# Patient Record
Sex: FEMALE | Race: WHITE | NOT HISPANIC OR LATINO | Employment: FULL TIME | URBAN - METROPOLITAN AREA
[De-identification: names, ages, dates, MRNs, and addresses within clinical notes are randomized per-mention and may not be internally consistent; named-entity substitution may affect disease eponyms.]

---

## 2023-05-24 ENCOUNTER — OFFICE VISIT (OUTPATIENT)
Dept: PRIMARY CARE | Facility: CLINIC | Age: 28
End: 2023-05-24
Payer: COMMERCIAL

## 2023-05-24 VITALS
WEIGHT: 154 LBS | DIASTOLIC BLOOD PRESSURE: 63 MMHG | HEIGHT: 67 IN | OXYGEN SATURATION: 95 % | RESPIRATION RATE: 17 BRPM | BODY MASS INDEX: 24.17 KG/M2 | SYSTOLIC BLOOD PRESSURE: 99 MMHG | HEART RATE: 65 BPM

## 2023-05-24 DIAGNOSIS — Z00.00 HEALTHCARE MAINTENANCE: Primary | ICD-10-CM

## 2023-05-24 DIAGNOSIS — R07.9 CHEST PAIN AT REST: ICD-10-CM

## 2023-05-24 DIAGNOSIS — Z13.6 SCREENING FOR CARDIOVASCULAR CONDITION: ICD-10-CM

## 2023-05-24 DIAGNOSIS — M79.9 DISORDER OF SOFT TISSUE OF THORACIC CAVITY: ICD-10-CM

## 2023-05-24 PROBLEM — R11.0 NAUSEA IN ADULT: Status: ACTIVE | Noted: 2023-05-24

## 2023-05-24 PROBLEM — N32.89 BLADDER SPASM: Status: ACTIVE | Noted: 2023-05-24

## 2023-05-24 PROBLEM — K92.1 HEMATOCHEZIA: Status: ACTIVE | Noted: 2023-05-24

## 2023-05-24 PROBLEM — R10.2 FEMALE PELVIC PAIN: Status: ACTIVE | Noted: 2023-05-24

## 2023-05-24 PROBLEM — B96.89 BACTERIAL VAGINOSIS: Status: ACTIVE | Noted: 2023-05-24

## 2023-05-24 PROBLEM — R39.89 BLADDER PAIN: Status: ACTIVE | Noted: 2023-05-24

## 2023-05-24 PROBLEM — R42 DIZZINESSES: Status: ACTIVE | Noted: 2023-05-24

## 2023-05-24 PROBLEM — Q65.89 HIP DYSPLASIA (HHS-HCC): Status: ACTIVE | Noted: 2023-05-24

## 2023-05-24 PROBLEM — G90.A POTS (POSTURAL ORTHOSTATIC TACHYCARDIA SYNDROME): Status: ACTIVE | Noted: 2023-05-24

## 2023-05-24 PROBLEM — R63.5 WEIGHT GAIN: Status: ACTIVE | Noted: 2023-05-24

## 2023-05-24 PROBLEM — N95.2 VAGINAL ATROPHY: Status: ACTIVE | Noted: 2023-05-24

## 2023-05-24 PROBLEM — G47.19 EXCESSIVE DAYTIME SLEEPINESS: Status: ACTIVE | Noted: 2023-05-24

## 2023-05-24 PROBLEM — R10.9 ABDOMINAL PAIN: Status: ACTIVE | Noted: 2023-05-24

## 2023-05-24 PROBLEM — R26.2 DIFFICULTY IN WALKING INVOLVING JOINT: Status: ACTIVE | Noted: 2023-05-24

## 2023-05-24 PROBLEM — R53.83 FATIGUE: Status: ACTIVE | Noted: 2023-05-24

## 2023-05-24 PROBLEM — G25.2 ACTION TREMOR: Status: ACTIVE | Noted: 2023-05-24

## 2023-05-24 PROBLEM — K60.2 ANAL FISSURE: Status: ACTIVE | Noted: 2023-05-24

## 2023-05-24 PROBLEM — J02.9 SORE THROAT: Status: ACTIVE | Noted: 2023-05-24

## 2023-05-24 PROBLEM — M25.529 ELBOW PAIN: Status: ACTIVE | Noted: 2023-05-24

## 2023-05-24 PROBLEM — D73.89 LESION OF SPLEEN: Status: ACTIVE | Noted: 2023-05-24

## 2023-05-24 PROBLEM — N32.81 OVERACTIVE BLADDER: Status: ACTIVE | Noted: 2023-05-24

## 2023-05-24 PROBLEM — N76.0 BACTERIAL VAGINOSIS: Status: ACTIVE | Noted: 2023-05-24

## 2023-05-24 PROBLEM — N93.9 ABNORMAL UTERINE BLEEDING: Status: ACTIVE | Noted: 2023-05-24

## 2023-05-24 PROBLEM — E16.2 HYPOGLYCEMIA: Status: ACTIVE | Noted: 2023-05-24

## 2023-05-24 PROBLEM — R93.89 ABNORMAL FINDING ON IMAGING: Status: ACTIVE | Noted: 2023-05-24

## 2023-05-24 PROBLEM — G47.411 CATAPLEXY (HHS-HCC): Status: ACTIVE | Noted: 2023-05-24

## 2023-05-24 PROBLEM — R06.83 SNORING: Status: ACTIVE | Noted: 2023-05-24

## 2023-05-24 PROBLEM — R30.0 DYSURIA: Status: ACTIVE | Noted: 2023-05-24

## 2023-05-24 PROBLEM — J35.1 TONSILLAR HYPERTROPHY: Status: ACTIVE | Noted: 2023-05-24

## 2023-05-24 PROBLEM — N39.0 RECURRENT URINARY TRACT INFECTION: Status: ACTIVE | Noted: 2023-05-24

## 2023-05-24 PROBLEM — L23.7 CONTACT DERMATITIS DUE TO POISON IVY: Status: ACTIVE | Noted: 2023-05-24

## 2023-05-24 PROBLEM — H52.7 REFRACTIVE ERROR: Status: ACTIVE | Noted: 2023-05-24

## 2023-05-24 PROBLEM — M25.551 RIGHT HIP PAIN: Status: ACTIVE | Noted: 2023-05-24

## 2023-05-24 PROBLEM — M54.2 NECK PAIN: Status: ACTIVE | Noted: 2023-05-24

## 2023-05-24 PROBLEM — R49.0 MUSCLE TENSION DYSPHONIA: Status: ACTIVE | Noted: 2023-05-24

## 2023-05-24 PROBLEM — M25.851 FEMOROACETABULAR IMPINGEMENT OF RIGHT HIP: Status: ACTIVE | Noted: 2023-05-24

## 2023-05-24 PROBLEM — G47.419 NARCOLEPSY (HHS-HCC): Status: ACTIVE | Noted: 2023-05-24

## 2023-05-24 PROBLEM — K64.4 ANAL SKIN TAG: Status: ACTIVE | Noted: 2023-05-24

## 2023-05-24 PROBLEM — N89.8 VAGINAL DISCHARGE: Status: ACTIVE | Noted: 2023-05-24

## 2023-05-24 PROBLEM — M25.429 ELBOW EFFUSION: Status: ACTIVE | Noted: 2023-05-24

## 2023-05-24 PROCEDURE — 1036F TOBACCO NON-USER: CPT | Performed by: INTERNAL MEDICINE

## 2023-05-24 PROCEDURE — 99385 PREV VISIT NEW AGE 18-39: CPT | Performed by: INTERNAL MEDICINE

## 2023-05-24 RX ORDER — OXYCODONE AND ACETAMINOPHEN 5; 325 MG/1; MG/1
TABLET ORAL
COMMUNITY
Start: 2023-01-31 | End: 2023-05-24 | Stop reason: ALTCHOICE

## 2023-05-24 RX ORDER — HYDROCORTISONE 25 MG/G
CREAM TOPICAL 2 TIMES DAILY
COMMUNITY
Start: 2023-02-15 | End: 2023-05-24 | Stop reason: ALTCHOICE

## 2023-05-24 RX ORDER — GABAPENTIN 600 MG/1
600 TABLET ORAL 3 TIMES DAILY
COMMUNITY
Start: 2023-04-21 | End: 2024-02-09 | Stop reason: ALTCHOICE

## 2023-05-24 RX ORDER — CELECOXIB 100 MG/1
CAPSULE ORAL
COMMUNITY
Start: 2023-01-31 | End: 2023-05-24 | Stop reason: ALTCHOICE

## 2023-05-24 RX ORDER — LOSARTAN POTASSIUM 25 MG/1
TABLET ORAL
COMMUNITY
Start: 2023-01-31 | End: 2023-05-24 | Stop reason: ALTCHOICE

## 2023-05-24 RX ORDER — ASPIRIN 325 MG
50000 TABLET, DELAYED RELEASE (ENTERIC COATED) ORAL
COMMUNITY
Start: 2023-01-31 | End: 2023-05-24 | Stop reason: ALTCHOICE

## 2023-05-24 RX ORDER — ESCITALOPRAM OXALATE 10 MG/1
10 TABLET ORAL
COMMUNITY
End: 2023-09-14 | Stop reason: SDUPTHER

## 2023-05-24 RX ORDER — CHLORHEXIDINE GLUCONATE 4 %
LIQUID (ML) TOPICAL
COMMUNITY
Start: 2023-01-22 | End: 2023-05-24 | Stop reason: ALTCHOICE

## 2023-05-24 RX ORDER — ASPIRIN 81 MG/1
TABLET ORAL
COMMUNITY
Start: 2023-01-31 | End: 2023-05-24 | Stop reason: ALTCHOICE

## 2023-05-24 RX ORDER — SULFAMETHOXAZOLE AND TRIMETHOPRIM 800; 160 MG/1; MG/1
1 TABLET ORAL 2 TIMES DAILY
COMMUNITY
End: 2024-02-09 | Stop reason: ALTCHOICE

## 2023-05-24 RX ORDER — CHLORHEXIDINE GLUCONATE ORAL RINSE 1.2 MG/ML
SOLUTION DENTAL
COMMUNITY
Start: 2023-01-22 | End: 2023-05-24 | Stop reason: ALTCHOICE

## 2023-05-24 RX ORDER — CEPHALEXIN 500 MG/1
CAPSULE ORAL
COMMUNITY
Start: 2023-03-24 | End: 2023-05-24 | Stop reason: ALTCHOICE

## 2023-05-24 RX ORDER — OMEPRAZOLE 10 MG/1
1 CAPSULE, DELAYED RELEASE ORAL DAILY
COMMUNITY
Start: 2023-01-31 | End: 2023-05-24 | Stop reason: ALTCHOICE

## 2023-05-24 RX ORDER — DOCUSATE SODIUM 100 MG/1
CAPSULE, LIQUID FILLED ORAL
COMMUNITY
Start: 2023-01-31 | End: 2023-05-24 | Stop reason: ALTCHOICE

## 2023-05-24 RX ORDER — PREDNISONE 5 MG/1
TABLET ORAL
COMMUNITY
Start: 2023-02-15 | End: 2023-05-24 | Stop reason: ALTCHOICE

## 2023-05-24 RX ORDER — TRAMADOL HYDROCHLORIDE 50 MG/1
TABLET ORAL
COMMUNITY
Start: 2023-01-31 | End: 2023-05-24 | Stop reason: ALTCHOICE

## 2023-05-24 ASSESSMENT — ENCOUNTER SYMPTOMS
CARDIOVASCULAR NEGATIVE: 1
EYES NEGATIVE: 1
ENDOCRINE NEGATIVE: 1
NEUROLOGICAL NEGATIVE: 1
PSYCHIATRIC NEGATIVE: 1
MUSCULOSKELETAL NEGATIVE: 1
CONSTITUTIONAL NEGATIVE: 1
GASTROINTESTINAL NEGATIVE: 1
RESPIRATORY NEGATIVE: 1
HEMATOLOGIC/LYMPHATIC NEGATIVE: 1

## 2023-05-24 NOTE — PROGRESS NOTES
Subjective   Patient ID: Zara Corey is a 27 y.o. female who presents for Establish Care (NPV, physical ).  HPI    Here for pe.     Co intermit pain  chest 2 months for 1 hour and  resolved . Better with deep  breaths.  Occurs  anytime .   no help with tums.   No belching.    substernal, rad to back.   No sob  or fainting.  No     Fu  anxiety     Request blood work.     On gabapentin for  nerve pan  right leg sp  periacetabular  osteotomy and hip arthroscopy.  Hip  dysplasia.  Acetabular impingement and labral tear.  Improved .  Still in pt.      Vit d   after surgery for healing.     Currently  on  bactrim for  acute uti.   Bv.      Review of Systems   Constitutional: Negative.    HENT: Negative.     Eyes: Negative.    Respiratory: Negative.     Cardiovascular: Negative.    Gastrointestinal: Negative.    Endocrine: Negative.    Musculoskeletal: Negative.    Skin: Negative.    Neurological: Negative.    Hematological: Negative.    Psychiatric/Behavioral: Negative.     All other systems reviewed and are negative.      Objective   Physical Exam  Vitals and nursing note reviewed. Exam conducted with a chaperone present.   Constitutional:       Appearance: Normal appearance.   HENT:      Head: Normocephalic and atraumatic.      Right Ear: Tympanic membrane, ear canal and external ear normal.      Left Ear: Tympanic membrane, ear canal and external ear normal.      Nose: Nose normal.      Mouth/Throat:      Mouth: Mucous membranes are moist.      Pharynx: Oropharynx is clear.   Eyes:      Extraocular Movements: Extraocular movements intact.      Conjunctiva/sclera: Conjunctivae normal.      Pupils: Pupils are equal, round, and reactive to light.   Cardiovascular:      Rate and Rhythm: Normal rate and regular rhythm.      Pulses: Normal pulses.      Heart sounds: Normal heart sounds.   Pulmonary:      Effort: Pulmonary effort is normal.      Breath sounds: Normal breath sounds.   Abdominal:      General: Abdomen is  flat. Bowel sounds are normal.      Palpations: Abdomen is soft.   Musculoskeletal:         General: Normal range of motion.      Cervical back: Neck supple.   Skin:     General: Skin is warm and dry.      Capillary Refill: Capillary refill takes 2 to 3 seconds.   Neurological:      General: No focal deficit present.      Mental Status: She is alert and oriented to person, place, and time. Mental status is at baseline.   Psychiatric:         Mood and Affect: Mood normal.         Behavior: Behavior normal.         Thought Content: Thought content normal.         Judgment: Judgment normal.         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

## 2023-05-25 ENCOUNTER — LAB (OUTPATIENT)
Dept: LAB | Facility: LAB | Age: 28
End: 2023-05-25
Payer: COMMERCIAL

## 2023-05-25 DIAGNOSIS — Z00.00 HEALTHCARE MAINTENANCE: ICD-10-CM

## 2023-05-25 DIAGNOSIS — Z13.6 SCREENING FOR CARDIOVASCULAR CONDITION: ICD-10-CM

## 2023-05-25 LAB
ALANINE AMINOTRANSFERASE (SGPT) (U/L) IN SER/PLAS: 9 U/L (ref 7–45)
ALBUMIN (G/DL) IN SER/PLAS: 4.4 G/DL (ref 3.4–5)
ALKALINE PHOSPHATASE (U/L) IN SER/PLAS: 53 U/L (ref 33–110)
ANION GAP IN SER/PLAS: 12 MMOL/L (ref 10–20)
ASPARTATE AMINOTRANSFERASE (SGOT) (U/L) IN SER/PLAS: 15 U/L (ref 9–39)
BILIRUBIN TOTAL (MG/DL) IN SER/PLAS: 0.8 MG/DL (ref 0–1.2)
CALCIUM (MG/DL) IN SER/PLAS: 9.6 MG/DL (ref 8.6–10.6)
CARBON DIOXIDE, TOTAL (MMOL/L) IN SER/PLAS: 29 MMOL/L (ref 21–32)
CHLORIDE (MMOL/L) IN SER/PLAS: 102 MMOL/L (ref 98–107)
CHOLESTEROL (MG/DL) IN SER/PLAS: 189 MG/DL (ref 0–199)
CHOLESTEROL IN HDL (MG/DL) IN SER/PLAS: 67.8 MG/DL
CHOLESTEROL/HDL RATIO: 2.8
CREATININE (MG/DL) IN SER/PLAS: 0.88 MG/DL (ref 0.5–1.05)
ERYTHROCYTE DISTRIBUTION WIDTH (RATIO) BY AUTOMATED COUNT: 12.5 % (ref 11.5–14.5)
ERYTHROCYTE MEAN CORPUSCULAR HEMOGLOBIN CONCENTRATION (G/DL) BY AUTOMATED: 32.4 G/DL (ref 32–36)
ERYTHROCYTE MEAN CORPUSCULAR VOLUME (FL) BY AUTOMATED COUNT: 91 FL (ref 80–100)
ERYTHROCYTES (10*6/UL) IN BLOOD BY AUTOMATED COUNT: 4.76 X10E12/L (ref 4–5.2)
GFR FEMALE: >90 ML/MIN/1.73M2
GLUCOSE (MG/DL) IN SER/PLAS: 82 MG/DL (ref 74–99)
HEMATOCRIT (%) IN BLOOD BY AUTOMATED COUNT: 43.5 % (ref 36–46)
HEMOGLOBIN (G/DL) IN BLOOD: 14.1 G/DL (ref 12–16)
LDL: 104 MG/DL (ref 0–99)
LEUKOCYTES (10*3/UL) IN BLOOD BY AUTOMATED COUNT: 6.4 X10E9/L (ref 4.4–11.3)
NRBC (PER 100 WBCS) BY AUTOMATED COUNT: 0 /100 WBC (ref 0–0)
PLATELETS (10*3/UL) IN BLOOD AUTOMATED COUNT: 244 X10E9/L (ref 150–450)
POTASSIUM (MMOL/L) IN SER/PLAS: 4.4 MMOL/L (ref 3.5–5.3)
PROTEIN TOTAL: 7.2 G/DL (ref 6.4–8.2)
SODIUM (MMOL/L) IN SER/PLAS: 139 MMOL/L (ref 136–145)
THYROTROPIN (MIU/L) IN SER/PLAS BY DETECTION LIMIT <= 0.05 MIU/L: 1.91 MIU/L (ref 0.44–3.98)
TRIGLYCERIDE (MG/DL) IN SER/PLAS: 88 MG/DL (ref 0–149)
UREA NITROGEN (MG/DL) IN SER/PLAS: 9 MG/DL (ref 6–23)
VLDL: 18 MG/DL (ref 0–40)

## 2023-05-25 PROCEDURE — 36415 COLL VENOUS BLD VENIPUNCTURE: CPT

## 2023-05-25 PROCEDURE — 80061 LIPID PANEL: CPT

## 2023-05-25 PROCEDURE — 84443 ASSAY THYROID STIM HORMONE: CPT

## 2023-05-25 PROCEDURE — 80053 COMPREHEN METABOLIC PANEL: CPT

## 2023-05-25 PROCEDURE — 85027 COMPLETE CBC AUTOMATED: CPT

## 2023-09-14 DIAGNOSIS — F41.9 ANXIETY: Primary | ICD-10-CM

## 2023-09-14 RX ORDER — ESCITALOPRAM OXALATE 10 MG/1
10 TABLET ORAL
Qty: 90 TABLET | Refills: 2 | Status: SHIPPED | OUTPATIENT
Start: 2023-09-14 | End: 2024-03-12 | Stop reason: SDUPTHER

## 2023-10-06 ENCOUNTER — TREATMENT (OUTPATIENT)
Dept: PHYSICAL THERAPY | Facility: HOSPITAL | Age: 28
End: 2023-10-06
Payer: COMMERCIAL

## 2023-10-06 DIAGNOSIS — M25.551 RIGHT HIP PAIN: Primary | ICD-10-CM

## 2023-10-06 PROCEDURE — 97110 THERAPEUTIC EXERCISES: CPT | Mod: GP

## 2023-10-06 ASSESSMENT — PAIN - FUNCTIONAL ASSESSMENT: PAIN_FUNCTIONAL_ASSESSMENT: 0-10

## 2023-10-06 ASSESSMENT — PAIN SCALES - GENERAL: PAINLEVEL_OUTOF10: 0 - NO PAIN

## 2023-10-06 NOTE — PROGRESS NOTES
"Physical Therapy    Physical Therapy Treatment    Patient Name: Zara Corey  MRN: [id number]  Today's Date: 10/6/2023  Time Calculation  Start Time: 0822      Assessment:  PT Assessment  PT Assessment Results: Decreased endurance, Decreased strength  Rehab Prognosis: Excellent    Plan:  OP PT Plan  Treatment/Interventions: Education/ Instruction, Electrical stimulation, Gait training, Manual therapy, Hot pack, Neuromuscular re-education, Therapeutic exercises  PT Plan: Skilled PT (plan to check in in 3-4 weeks)  PT Frequency: 1 time per week  Duration: in 3-4 weeks  Onset Date: 01/30/23  Number of Treatments Authorized: 40 (4/40 used on 10/6/23 ($40))  Rehab Potential: Good  Plan of Care Agreement: Patient    Current Problem  1. Right hip pain  Follow Up In Physical Therapy          Subjective   General  Reason for Referral: R hip pain s/p REJI and hip arthroscopy  Referred By: Mayelin  General Comment: Patient states she is still feeling really good. She is going to the gym consistently every morning before work. She went swimming and it felt great. She states she thinks she is going to get her screws out in December       Pain  Pain Assessment: 0-10  Pain Score: 0 - No pain  Pain Location: Hip  Pain Orientation: Right    Objective       Treatments:  Therapeutic Exercise  Therapeutic Exercise Performed: Yes  Therapeutic Exercise Activity 1: upright bike L4X 5min  Therapeutic Exercise Activity 2: SLS squat tap backs to 24\" block  Therapeutic Exercise Activity 3: posterior to side lunge with slider X 10 each R/L  Therapeutic Exercise Activity 4: dynamic stretching hamstring/quad stretching  Therapeutic Exercise Activity 5: - TM jogging X 2 min walking warm up X 2 intervals jogging for 1 min with 1 min walking break (walkingpace 2.7 mph, jogging pace 4.5 mph - very mild antalgic gait, decreased ROM)  Therapeutic Exercise Activity 6: seated piriformis stretch X 1 min on R side  Therapeutic Exercise Activity 7: lateral " "step down from 8\" step X 15 R./L  Therapeutic Exercise Activity 8: SL heel raise off step X 25 R/L  Therapeutic Exercise Activity 9: Standing march with blue band around feet X 20 R/L  Therapeutic Exercise Activity 10: standing hip abduction with blue band around ankles X15 R/L  Therapeutic Exercise Activity 11: pigeon pose X 30\" R/L  Therapeutic Exercise Activity 12: pigeon pose with hip flexor stretch variation X 30\" R/L  Therapeutic Exercise Activity 13: downward dog 30\"  Activity:  Endurance: Tolerates 30+ min exercise without fatigue    OP EDUCATION:  Education  Individual(s) Educated: Patient  Education Provided: Home Exercise Program, POC  Equipment: Thera-Band (blue)    Goals:     "

## 2023-10-11 ENCOUNTER — APPOINTMENT (OUTPATIENT)
Dept: UROLOGY | Facility: HOSPITAL | Age: 28
End: 2023-10-11
Payer: COMMERCIAL

## 2023-10-24 ENCOUNTER — DOCUMENTATION (OUTPATIENT)
Dept: PHYSICAL THERAPY | Facility: HOSPITAL | Age: 28
End: 2023-10-24
Payer: COMMERCIAL

## 2023-10-24 NOTE — PROGRESS NOTES
Physical Therapy                 Therapy Communication Note    Patient Name: Zara Corey  MRN: [id number]  Today's Date: 10/24/2023     Discipline: Physical Therapy    Missed Time: No Show    Comment: Patient no showed to PT follow up appt on 10/23/23 at 3:15 PM.

## 2023-10-26 ENCOUNTER — OFFICE VISIT (OUTPATIENT)
Dept: UROLOGY | Facility: CLINIC | Age: 28
End: 2023-10-26
Payer: COMMERCIAL

## 2023-10-26 DIAGNOSIS — N83.209 CYST OF OVARY, UNSPECIFIED LATERALITY: ICD-10-CM

## 2023-10-26 DIAGNOSIS — M25.551 RIGHT HIP PAIN: ICD-10-CM

## 2023-10-26 PROCEDURE — 1036F TOBACCO NON-USER: CPT | Performed by: OBSTETRICS & GYNECOLOGY

## 2023-10-26 PROCEDURE — 99213 OFFICE O/P EST LOW 20 MIN: CPT | Performed by: OBSTETRICS & GYNECOLOGY

## 2023-10-26 NOTE — PROGRESS NOTES
FOLLOW-UP VISIT     PROBLEM LIST:    1. Right hip pain  Follow Up In Physical Therapy      2. Cyst of ovary, unspecified laterality  US pelvis transvaginal           HISTORY OF PRESENT ILLNESS:   Zara Corey is a 27 y.o. female who was last seen August 2022 for pelvic pain and recurrent UTIs.    We last saw her in August 2022 for her pelvic pain and recurrent UTIs. At that time she was going to be having hip surgery. She has had this done since then and it unfortunately did not help. She explains that she has changed behaviors following surgery that have helped somewhat. She has stopped wearing thongs and less tight fitting clothing in general. She has been even more cautious of hygeine, specifically after intercourse. She immediately showers afterwards and her UTIs have lessened. She occasionally has days where she has pain with urination but this resolves on its own. She notes that she increases hydration and it helps Her  last UTI was in May 2023.     She is having pain with sex still. She explans she just started virtual PT that has been helping. They informed her that she is always tense and that could be causing her pain. She is doing this virtual PT as she has to save her in person appts for her hip    She had some concerns regarding the cysts she still has on her ovary. When she gets her period cramps take her breath away due to pain and bleeding is heavier.    PAST MEDICAL HISTORY:  Past Medical History:   Diagnosis Date    Dysphonia 01/30/2015    Hoarseness    Encounter for gynecological examination (general) (routine) without abnormal findings 06/25/2021    Well woman exam with routine gynecological exam    Encounter for gynecological examination (general) (routine) without abnormal findings 11/15/2017    Well woman exam with routine gynecological exam    Encounter for gynecological examination (general) (routine) without abnormal findings 06/01/2020    Well woman exam with routine gynecological  exam    Other specified health status     No pertinent past surgical history    Other specified health status     No pertinent past medical history    Personal history of other diseases of the respiratory system 01/23/2015    History of asthma    Personal history of other mental and behavioral disorders     History of anorexia nervosa       PAST SURGICAL HISTORY:  Past Surgical History:   Procedure Laterality Date    OTHER SURGICAL HISTORY  12/16/2021    Ovarian cystectomy    TONSILLECTOMY  11/15/2017    Tonsillectomy        ALLERGIES:   Allergies   Allergen Reactions    Reglan [Metoclopramide Hcl] Other     Pt gets really shaky          SOCIAL HISTORY:  Patient  reports that she has never smoked. She has never used smokeless tobacco. She reports current alcohol use. She reports that she does not use drugs.   Social History     Socioeconomic History    Marital status:      Spouse name: Not on file    Number of children: Not on file    Years of education: Not on file    Highest education level: Not on file   Occupational History    Not on file   Tobacco Use    Smoking status: Never    Smokeless tobacco: Never   Vaping Use    Vaping Use: Never used   Substance and Sexual Activity    Alcohol use: Yes     Comment: socially    Drug use: Never    Sexual activity: Not on file   Other Topics Concern    Not on file   Social History Narrative    Not on file     Social Determinants of Health     Financial Resource Strain: Not on file   Food Insecurity: Not on file   Transportation Needs: Not on file   Physical Activity: Not on file   Stress: Not on file   Social Connections: Not on file   Intimate Partner Violence: Not on file   Housing Stability: Not on file       FAMILY HISTORY:  Family History   Problem Relation Name Age of Onset    Diabetes Father      Hypothyroidism Sister      Cancer Other Grandmother     Diabetes Other Grandfather        REVIEW OF SYSTEMS:  Constitutional: Negative for fever and chills. Denies  anorexia, weight loss.  Eyes: Negative for visual disturbance.   Respiratory: Negative for shortness of breath.    Cardiovascular: Negative for chest pain.   Gastrointestinal: Negative for nausea and vomiting.   Genitourinary: See interval history above.  Skin: Negative for rash.   Neurological: Negative for dizziness and numbness.   Psychiatric/Behavioral: Negative for confusion and decreased concentration.     PHYSICAL EXAM:  There were no vitals taken for this visit.  Constitutional: Patient appears well-developed and well-nourished. No distress.    Head: Normocephalic and atraumatic.    Neck: Normal range of motion.    Cardiovascular: Normal rate.    Pulmonary/Chest: Effort normal. No respiratory distress.     Musculoskeletal: Normal range of motion.    Neurological: Alert and oriented to person, place, and time.  Psychiatric: Normal mood and affect. Behavior is normal. Thought content normal.        External female genitalia is normal    There are no lesions on vulva, labia major or the labia minora     The clitoral prepuce is normal     The urethra is also normal     Speculum exam done gently and cervix visualized, no friable tissue, cysts/lesions noted. IUD still in place.        Lab Results   Component Value Date    BUN 9 05/25/2023    CREATININE 0.88 05/25/2023     05/25/2023    K 4.4 05/25/2023     05/25/2023    CO2 29 05/25/2023    CALCIUM 9.6 05/25/2023      Lab Results   Component Value Date    WBC 6.4 05/25/2023    RBC 4.76 05/25/2023    HGB 14.1 05/25/2023    HCT 43.5 05/25/2023    MCV 91 05/25/2023    MCHC 32.4 05/25/2023    RDW 12.5 05/25/2023     05/25/2023         Assessment:      1. Right hip pain  Follow Up In Physical Therapy      2. Cyst of ovary, unspecified laterality  US pelvis transvaginal          Zara Tothastrid is a 27 y.o. female with pelvic pain     Plan:   As she has concerns about the ovarian cysts she has had in the past, we will order a new pelvic ultrasound to  assess this. If there are any abnormal findings on the ultrasound we will call her and let her know. Otherwise she will follow up with us next year for her annual exam at that time.     Scribe Attestation  By signing my name below, IGisela Scribe   attest that this documentation has been prepared under the direction and in the presence of Valerie Kenney MD MPH.

## 2023-11-04 ENCOUNTER — HOSPITAL ENCOUNTER (OUTPATIENT)
Dept: RADIOLOGY | Facility: HOSPITAL | Age: 28
Discharge: HOME | End: 2023-11-04
Payer: COMMERCIAL

## 2023-11-04 DIAGNOSIS — N83.209 CYST OF OVARY, UNSPECIFIED LATERALITY: ICD-10-CM

## 2023-11-04 PROCEDURE — 76830 TRANSVAGINAL US NON-OB: CPT

## 2023-11-04 PROCEDURE — 76856 US EXAM PELVIC COMPLETE: CPT | Performed by: RADIOLOGY

## 2023-11-04 PROCEDURE — 76830 TRANSVAGINAL US NON-OB: CPT | Performed by: RADIOLOGY

## 2023-11-16 ENCOUNTER — DOCUMENTATION (OUTPATIENT)
Dept: PHYSICAL THERAPY | Facility: CLINIC | Age: 28
End: 2023-11-16
Payer: COMMERCIAL

## 2023-11-16 NOTE — PROGRESS NOTES
Discharge Summary    Name: Zara Corey  MRN: [id number]  : 1995  Date: 23    Discharge Summary: PT    Discharge Information: Date of discharge 23, Date of last visit 23, Date of evaluation 23, Number of attended visits 17, Referred by Dr. Dick, and Referred for s/p R REJI    Therapy Summary: Pt presented s/p R REJI.  She had delayed wound healing and some trouble getting ROM, but overall made good improvements.  She went on hold d.t. insurance constraints and getting a new job with anticipated insurance changes.    Discharge Status: Status unknown, she went on hold and did not return.     Rehab Discharge Reason: Patient requested due to insurance constraints.

## 2023-11-27 ENCOUNTER — DOCUMENTATION (OUTPATIENT)
Dept: PHYSICAL THERAPY | Facility: HOSPITAL | Age: 28
End: 2023-11-27
Payer: COMMERCIAL

## 2023-11-27 NOTE — PROGRESS NOTES
Physical Therapy    Discharge Summary    Name: Zara Corey  MRN: [id number]  : 1995  Date: 23    Discharge Summary: PT    Discharge Information: Date of discharge 23, Date of last visit 10/6/23, Date of evaluation 23, Number of attended visits 4, Referred by Mayelin, and Referred for R hip pain s/p REJI and hip arthroscopy    Therapy Summary:  worked to improvve B LE strenth, tension/tenderness in R glute and groin area, pain levels, jogging mechancis    Discharge Status: at last appt on 10/6/23:   Patient had progressed really well, was feeling ready to be Dced. No showed to final appt though. Talked to patient on the phone who stated she returned to her normal gym routine and was still feeling okay. Kept chart open 30 days from that point.    Rehab Discharge Reason: Achieved all and/or the most significant goals(s)

## 2023-12-08 ENCOUNTER — APPOINTMENT (OUTPATIENT)
Dept: UROLOGY | Facility: CLINIC | Age: 28
End: 2023-12-08
Payer: COMMERCIAL

## 2024-01-31 ENCOUNTER — OFFICE VISIT (OUTPATIENT)
Dept: ORTHOPEDIC SURGERY | Facility: CLINIC | Age: 29
End: 2024-01-31
Payer: COMMERCIAL

## 2024-01-31 ENCOUNTER — HOSPITAL ENCOUNTER (OUTPATIENT)
Dept: RADIOLOGY | Facility: CLINIC | Age: 29
Discharge: HOME | End: 2024-01-31
Payer: COMMERCIAL

## 2024-01-31 DIAGNOSIS — Q65.89 HIP DYSPLASIA (HHS-HCC): ICD-10-CM

## 2024-01-31 DIAGNOSIS — T84.84XA PAINFUL ORTHOPAEDIC HARDWARE (CMS-HCC): ICD-10-CM

## 2024-01-31 DIAGNOSIS — Q65.89 HIP DYSPLASIA (HHS-HCC): Primary | ICD-10-CM

## 2024-01-31 PROCEDURE — 72190 X-RAY EXAM OF PELVIS: CPT

## 2024-01-31 PROCEDURE — 72190 X-RAY EXAM OF PELVIS: CPT | Performed by: RADIOLOGY

## 2024-01-31 PROCEDURE — 1036F TOBACCO NON-USER: CPT | Performed by: ORTHOPAEDIC SURGERY

## 2024-01-31 PROCEDURE — 99214 OFFICE O/P EST MOD 30 MIN: CPT | Performed by: ORTHOPAEDIC SURGERY

## 2024-01-31 RX ORDER — SODIUM CHLORIDE 9 MG/ML
100 INJECTION, SOLUTION INTRAVENOUS CONTINUOUS
Status: CANCELLED | OUTPATIENT
Start: 2024-01-31

## 2024-01-31 NOTE — PROGRESS NOTES
28 y.o. female 1 year out from a right combined REJI and hip arthroscopy.  She is having a few issues today.  First is right superior pelvis pain over her iliac crest.  This is likely related to her screws.  I do feel prominent to her.  She feels some cramping and muscle spasms in this area as well.  She also does feel posterior ischial pain near her hamstrings origin and to a lesser degree she will have some medial and anterior groin pain significant activity.  The biggest issue is her iliac crest type pain followed by her ischial tuberosity followed by anterior medial groin.  She is also having left-sided symptoms due to likely overloading compensation on her right side.  Her left side is starting to feel similar to her right did prior to surgery.  She also mentions that she has some radiating pain down the back of her leg past her knee into her lateral calf.    The patient does not endorse fevers and chills. The patient does not endorse any change in her vision or hearing. They do not endorse chest pain, shortness of breath. The patient does not endorse any abdominal discomfort. They do not endorse any skin irritation or lesions. They do not endorse any new numbness and tingling or as otherwise stated in the history of present illness.    She is in no acute distress, alert and oriented x 3.    Mood and affect are appropriate.    Respirations are unlabored.    Distal limb is pink and well perfused.    Right lower extremity evaluation demonstrates well-healed surgical incision with moderate discomfort over her screws.  Mildly positive anterior impingement test.  Negative posterior apprehension test.  Lateral cutaneous nerve sensation is partially intact.  Gait examination is nonantalgic.  Sensation is intact to light touch in the tibial, sural, saphenous, superficial peroneal, and deep peroneal nerve distributions. Foot is warm and well-perfused.    Left side demonstrates positive anterior impingement test and  positive posterior apprehension test with a positive Stinchfield maneuver.    I personally reviewed multiple views of the hip and pelvis were obtained in the office today demonstrate maintenance of reduction of her osteotomy fragment, interval healing, and a stable position of the hardware.    28 y.o. female 1 year out from a right combined REJI and hip arthroscopy doing well.  At this point I recommended hardware removal on the right due to her local irritation and pain.  Will schedule that for 2/16.  After that we will check her in the office and likely order an MR arthrogram to evaluate the internal aspect of her hip and also to look at her hamstrings origin attachment.    Natural History reviewed. All questions answered. The patient was in agreement with the plan.      **This note was created using voice recognition software and was not corrected for typographical or grammatical errors.**

## 2024-02-01 PROBLEM — T84.84XA PAINFUL ORTHOPAEDIC HARDWARE (CMS-HCC): Status: ACTIVE | Noted: 2024-01-31

## 2024-02-09 ENCOUNTER — PRE-ADMISSION TESTING (OUTPATIENT)
Dept: PREADMISSION TESTING | Facility: HOSPITAL | Age: 29
End: 2024-02-09
Payer: COMMERCIAL

## 2024-02-09 ENCOUNTER — LAB (OUTPATIENT)
Dept: LAB | Facility: LAB | Age: 29
End: 2024-02-09
Payer: COMMERCIAL

## 2024-02-09 VITALS
OXYGEN SATURATION: 100 % | WEIGHT: 168.21 LBS | HEIGHT: 67 IN | BODY MASS INDEX: 26.4 KG/M2 | SYSTOLIC BLOOD PRESSURE: 128 MMHG | TEMPERATURE: 98.6 F | HEART RATE: 67 BPM | DIASTOLIC BLOOD PRESSURE: 71 MMHG

## 2024-02-09 DIAGNOSIS — Q65.89 HIP DYSPLASIA (HHS-HCC): ICD-10-CM

## 2024-02-09 DIAGNOSIS — T84.84XA PAINFUL ORTHOPAEDIC HARDWARE (CMS-HCC): ICD-10-CM

## 2024-02-09 LAB
ANION GAP SERPL CALC-SCNC: 10 MMOL/L
APTT PPP: 28.5 SECONDS (ref 22–32.5)
BASOPHILS # BLD AUTO: 0.06 X10*3/UL (ref 0–0.1)
BASOPHILS NFR BLD AUTO: 0.6 %
BUN SERPL-MCNC: 18 MG/DL (ref 8–25)
CALCIUM SERPL-MCNC: 9.5 MG/DL (ref 8.5–10.4)
CHLORIDE SERPL-SCNC: 102 MMOL/L (ref 97–107)
CO2 SERPL-SCNC: 27 MMOL/L (ref 24–31)
CREAT SERPL-MCNC: 0.8 MG/DL (ref 0.4–1.6)
EGFRCR SERPLBLD CKD-EPI 2021: >90 ML/MIN/1.73M*2
EOSINOPHIL # BLD AUTO: 0.19 X10*3/UL (ref 0–0.7)
EOSINOPHIL NFR BLD AUTO: 1.9 %
ERYTHROCYTE [DISTWIDTH] IN BLOOD BY AUTOMATED COUNT: 12.3 % (ref 11.5–14.5)
GLUCOSE SERPL-MCNC: 67 MG/DL (ref 65–99)
HCT VFR BLD AUTO: 40.6 % (ref 36–46)
HGB BLD-MCNC: 13.8 G/DL (ref 12–16)
IMM GRANULOCYTES # BLD AUTO: 0.03 X10*3/UL (ref 0–0.7)
IMM GRANULOCYTES NFR BLD AUTO: 0.3 % (ref 0–0.9)
INR PPP: 1 (ref 0.9–1.2)
LYMPHOCYTES # BLD AUTO: 2.21 X10*3/UL (ref 1.2–4.8)
LYMPHOCYTES NFR BLD AUTO: 22.2 %
MCH RBC QN AUTO: 30.6 PG (ref 26–34)
MCHC RBC AUTO-ENTMCNC: 34 G/DL (ref 32–36)
MCV RBC AUTO: 90 FL (ref 80–100)
MONOCYTES # BLD AUTO: 0.62 X10*3/UL (ref 0.1–1)
MONOCYTES NFR BLD AUTO: 6.2 %
NEUTROPHILS # BLD AUTO: 6.83 X10*3/UL (ref 1.2–7.7)
NEUTROPHILS NFR BLD AUTO: 68.8 %
NRBC BLD-RTO: 0 /100 WBCS (ref 0–0)
PLATELET # BLD AUTO: 324 X10*3/UL (ref 150–450)
POTASSIUM SERPL-SCNC: 4.5 MMOL/L (ref 3.4–5.1)
PROTHROMBIN TIME: 10.8 SECONDS (ref 9.3–12.7)
RBC # BLD AUTO: 4.51 X10*6/UL (ref 4–5.2)
SODIUM SERPL-SCNC: 139 MMOL/L (ref 133–145)
WBC # BLD AUTO: 9.9 X10*3/UL (ref 4.4–11.3)

## 2024-02-09 PROCEDURE — 36415 COLL VENOUS BLD VENIPUNCTURE: CPT

## 2024-02-09 PROCEDURE — 80048 BASIC METABOLIC PNL TOTAL CA: CPT

## 2024-02-09 PROCEDURE — 85025 COMPLETE CBC W/AUTO DIFF WBC: CPT

## 2024-02-09 PROCEDURE — 85730 THROMBOPLASTIN TIME PARTIAL: CPT

## 2024-02-09 PROCEDURE — 99203 OFFICE O/P NEW LOW 30 MIN: CPT | Performed by: PHYSICIAN ASSISTANT

## 2024-02-09 PROCEDURE — 85610 PROTHROMBIN TIME: CPT

## 2024-02-09 RX ORDER — CALCIUM CARBONATE 300MG(750)
400 TABLET,CHEWABLE ORAL DAILY
COMMUNITY

## 2024-02-09 RX ORDER — MULTIVITAMIN/IRON/FOLIC ACID 18MG-0.4MG
1 TABLET ORAL DAILY
COMMUNITY

## 2024-02-09 RX ORDER — GLUCOSAM/CHONDRO/HERB 149/HYAL 750-100 MG
2 TABLET ORAL DAILY
COMMUNITY

## 2024-02-09 RX ORDER — ACETAMINOPHEN 500 MG
2000 TABLET ORAL DAILY
COMMUNITY

## 2024-02-09 ASSESSMENT — CHADS2 SCORE
DIABETES: NO
HYPERTENSION: NO
AGE GREATER THAN OR EQUAL TO 75: NO
CHADS2 SCORE: 0
CHF: NO
PRIOR STROKE OR TIA OR THROMBOEMBOLISM: NO

## 2024-02-09 ASSESSMENT — DUKE ACTIVITY SCORE INDEX (DASI)
CAN YOU WALK A BLOCK OR TWO ON LEVEL GROUND: YES
TOTAL_SCORE: 58.2
CAN YOU TAKE CARE OF YOURSELF (EAT, DRESS, BATHE, OR USE TOILET): YES
CAN YOU PARTICIPATE IN MODERATE RECREATIONAL ACTIVITIES LIKE GOLF, BOWLING, DANCING, DOUBLES TENNIS OR THROWING A BASEBALL OR FOOTBALL: YES
CAN YOU HAVE SEXUAL RELATIONS: YES
CAN YOU WALK INDOORS, SUCH AS AROUND YOUR HOUSE: YES
CAN YOU DO LIGHT WORK AROUND THE HOUSE LIKE DUSTING OR WASHING DISHES: YES
CAN YOU DO HEAVY WORK AROUND THE HOUSE LIKE SCRUBBING FLOORS OR LIFTING AND MOVING HEAVY FURNITURE: YES
CAN YOU DO YARD WORK LIKE RAKING LEAVES, WEEDING OR PUSHING A MOWER: YES
CAN YOU CLIMB A FLIGHT OF STAIRS OR WALK UP A HILL: YES
CAN YOU PARTICIPATE IN STRENOUS SPORTS LIKE SWIMMING, SINGLES TENNIS, FOOTBALL, BASKETBALL, OR SKIING: YES
DASI METS SCORE: 9.9
CAN YOU DO MODERATE WORK AROUND THE HOUSE LIKE VACUUMING, SWEEPING FLOORS OR CARRYING GROCERIES: YES
CAN YOU RUN A SHORT DISTANCE: YES

## 2024-02-09 ASSESSMENT — PAIN SCALES - GENERAL: PAINLEVEL_OUTOF10: 3

## 2024-02-09 ASSESSMENT — LIFESTYLE VARIABLES: SMOKING_STATUS: NONSMOKER

## 2024-02-09 ASSESSMENT — PAIN - FUNCTIONAL ASSESSMENT: PAIN_FUNCTIONAL_ASSESSMENT: 0-10

## 2024-02-09 ASSESSMENT — PAIN DESCRIPTION - DESCRIPTORS: DESCRIPTORS: SHARP

## 2024-02-09 NOTE — PREPROCEDURE INSTRUCTIONS
Medication List            Accurate as of February 9, 2024  7:28 AM. Always use your most recent med list.                b complex 0.4 mg tablet  Medication Adjustments for Surgery: Stop 7 days before surgery     escitalopram 10 mg tablet  Commonly known as: Lexapro  Take 1 tablet (10 mg) by mouth once daily.  Medication Adjustments for Surgery: Continue until night before surgery     Fish OiL 1,000 mg (120 mg-180 mg) capsule  Generic drug: omega 3-dha-epa-fish oil  Medication Adjustments for Surgery: Stop 7 days before surgery     levonorgestrel 21 mcg/24 hours (8 yrs) 52 mg IUD  Commonly known as: Mirena     magnesium oxide 400 mg tablet  Commonly known as: Mag-Ox  Medication Adjustments for Surgery: Stop 7 days before surgery     Vitamin D3 50 mcg (2,000 unit) capsule  Generic drug: cholecalciferol  Medication Adjustments for Surgery: Stop 7 days before surgery                              NPO Instructions:    Do not eat any food after midnight the night before your surgery/procedure.    Additional Instructions:     Day of Surgery:  Review your medication instructions, take indicated medications  Wear  comfortable loose fitting clothing  Do not use moisturizers, creams, lotions or perfume  All jewelry and valuables should be left at home  PAT DISCHARGE INSTRUCTIONS    Please call the Same Day Surgery (SDS) Department of the hospital where your procedure will be performed after 2:00 PM the day before your surgery. If you are scheduled on a Monday, or a Tuesday following a Monday holiday, you will need to call on the last business day prior to your surgery.    [facility]  [address] [place] [address]  [phone]    Please let your surgeon know if:      You develop any open sores, shingles, burning or painful urination as these may increase your risk of an infection.   You no longer wish to have the surgery.   Any other personal circumstances change that may lead  to the need to cancel or defer this surgery-such as being sick or getting admitted to any hospital within one week of your planned procedure.    Your contact details change, such as a change of address or phone number.    Starting now:     Please DO NOT drink alcohol or smoke for 24 hours before surgery. It is well known that quitting smoking can make a huge difference to your health and recovery from surgery. The longer you abstain from smoking, the better your chances of a healthy recovery. If you need help with quitting, call 1-800-QUIT-NOW to be connected to a trained counselor who will discuss the best methods to help you quit.     Before your surgery:    Please stop all supplements 7 days prior to surgery. Or as directed by your surgeon.   Please stop taking NSAID pain medicine such as Advil and Motrin 7 days before surgery.    If you develop any fever, cough, cold, rashes, cuts, scratches, scrapes, urinary symptoms or infection anywhere on your body (including teeth and gums) prior to surgery, please call your surgeon’s office as soon as possible. This may require treatment to reduce the chance of cancellation on the day of surgery.    The day before your surgery:   DIET- Do not eat or drink anything after midnight the night before surgery, including mints, candy and gum.   Get a good night’s rest.  Use the special soap for bathing if you have been instructed to use one.    Scheduled surgery times may change and you will be notified if this occurs - please check your personal voicemail for any updates.     On the morning of surgery:   Wear comfortable, loose fitting clothes which open in the front. Please do not wear moisturizers, creams, lotions, makeup or perfume.    Please bring with you to surgery:   Photo ID and insurance card   Current list of medicines and allergies   Pacemaker/ Defibrillator/Heart stent cards   CPAP machine and mask    Slings/ splints/ crutches   A copy of your complete advanced  directive/DHPOA.    Please do NOT bring with you to surgery:   All jewelry and valuables should be left at home.   Prosthetic devices such as contact lenses, hearing aids, dentures, eyelash extensions, hairpins and body piercings must be removed prior to going in to the surgical suite.    After outpatient surgery:   A responsible adult MUST accompany you at the time of discharge and stay with you for 24 hours after your surgery. You may NOT drive yourself home after surgery.    Do not drive, operate machinery, make critical decisions or do activities that require co-ordination or balance until after a night’s sleep.   Do not drink alcoholic beverages for 24 hours.   Instructions for resuming your medications will be provided by your surgeon.    CALL YOUR DOCTOR AFTER SURGERY IF YOU HAVE:     Chills and/or a fever of 101° F or higher.    Redness, swelling, pus or drainage from your surgical wound or a bad smell from the wound.    Lightheadedness, fainting or confusion.    Persistent vomiting (throwing up) and are not able to eat or drink for 12 hours.    Three or more loose, watery bowel movements in 24 hours (diarrhea).   Difficulty or pain while urinating( after non-urological surgery)    Pain and swelling in your legs, especially if it is only on one side.    Difficulty breathing or are breathing faster than normal.    Any new concerning symptoms.

## 2024-02-09 NOTE — CPM/PAT H&P
CPM/PAT Evaluation       [facility] ([facility]  /Age: 1995/ y.o.     In-Person       Chief Complaint: Painful hardware    HPI 28-year-old female with history of congenital hip dysplasia.  Patient underwent hip arthroscopy with labral repair 2023.  Patient complains of painful hardware.  She denies fevers or chills.  Patient has history of anxiety, depression and anorexia nervosa.  Patient is scheduled for removal of painful hardware right pelvis 2024    Past Medical History:   Diagnosis Date    Anorexia nervosa     Anxiety and depression     Dysphonia 2015    Hoarseness    Encounter for gynecological examination (general) (routine) without abnormal findings 2021    Well woman exam with routine gynecological exam    Encounter for gynecological examination (general) (routine) without abnormal findings 11/15/2017    Well woman exam with routine gynecological exam    Encounter for gynecological examination (general) (routine) without abnormal findings 2020    Well woman exam with routine gynecological exam    Personal history of other diseases of the respiratory system 2015    History of asthma    PONV (postoperative nausea and vomiting)        Past Surgical History:   Procedure Laterality Date    HIP ARTHROSCOPY W/ LABRAL REPAIR Right     OVARIAN CYST REMOVAL      TONSILLECTOMY  11/15/2017    Tonsillectomy       Patient  has no history on file for sexual activity.    Family History   Problem Relation Name Age of Onset    Diabetes Father      Hypothyroidism Sister      Cancer Other Grandmother     Diabetes Other Grandfather        Allergies   Allergen Reactions    Reglan [Metoclopramide Hcl] Other     Pt gets really shaky     Adhesive Tape-Silicones Rash       Current Outpatient Medications   Medication Instructions    b complex 0.4 mg tablet 1 tablet, oral, Daily    cholecalciferol (VITAMIN D3) 2,000 Units, oral, Daily    escitalopram (LEXAPRO) 10 mg,  "oral, Daily RT    levonorgestrel (Mirena) 21 mcg/24 hours (8 yrs) 52 mg IUD 1 each, intrauterine, Once    magnesium oxide (MAG-OX) 400 mg, oral, Daily    omega 3-dha-epa-fish oil (Fish OiL) 1,000 mg (120 mg-180 mg) capsule 2 capsules, oral, Daily     Review of Systems   Eyes:         Patient wears glasses   All other systems reviewed and are negative.       Physical Exam  Vitals reviewed. Physical exam within normal limits.   Constitutional:       Appearance: Normal appearance.   HENT:      Head: Normocephalic and atraumatic.      Mouth/Throat:      Mouth: Mucous membranes are moist.   Eyes:      Extraocular Movements: Extraocular movements intact.      Pupils: Pupils are equal, round, and reactive to light.   Cardiovascular:      Rate and Rhythm: Normal rate and regular rhythm.      Pulses: Normal pulses.      Heart sounds: Normal heart sounds.   Pulmonary:      Effort: Pulmonary effort is normal.      Breath sounds: Normal breath sounds.   Abdominal:      General: Bowel sounds are normal.      Palpations: Abdomen is soft.   Musculoskeletal:         General: Normal range of motion.      Cervical back: Normal range of motion.   Lymphadenopathy:      Cervical: No cervical adenopathy.   Skin:     General: Skin is warm and dry.   Neurological:      General: No focal deficit present.      Mental Status: She is alert and oriented to person, place, and time.   Psychiatric:         Mood and Affect: Mood normal.         Behavior: Behavior normal.         Thought Content: Thought content normal.         Judgment: Judgment normal.          PAT AIRWAY:   Airway:     Mallampati::  I    Neck ROM::  Full      Vitals  Heart Rate:  [67]   Temp:  [37 °C (98.6 °F)]   BP: (128)/(71)   Height:  [170.2 cm (5' 7\")]   Weight:  [76.3 kg (168 lb 3.4 oz)]   SpO2:  [100 %]        DASI Risk Score      Flowsheet Row Most Recent Value   DASI SCORE 58.2   METS Score (Will be calculated only when all the questions are answered) 9.9      "     Caprini DVT Assessment      Flowsheet Row Most Recent Value   DVT Score 2   History Prior major surgery   Age Less than 40 years   BMI 30 or less          Modified Frailty Index    No data to display       CHADS2 Stroke Risk  Current as of 9 minutes ago        N/A 3 - 100%: High Risk   2 - 3%: Medium Risk   0 - 2%: Low Risk     Last Change: N/A          This score determines the patient's risk of having a stroke if the patient has atrial fibrillation.        This score is not applicable to this patient. Components are not calculated.          Revised Cardiac Risk Index      Flowsheet Row Most Recent Value   Revised Cardiac Risk Calculator 0          Apfel Simplified Score      Flowsheet Row Most Recent Value   Apfel Simplified Score Calculator 3          Risk Analysis Index Results This Encounter    No data found in the last 1 encounters.       Stop Bang Score      Flowsheet Row Most Recent Value   Do you snore loudly? 0   Do you often feel tired or fatigued after your sleep? 0   Has anyone ever observed you stop breathing in your sleep? 0   Do you have or are you being treated for high blood pressure? 0   Recent BMI (Calculated) 26.3   Is BMI greater than 35 kg/m2? 0=No   Age older than 50 years old? 0=No   Is your neck circumference greater than 17 inches (Male) or 16 inches (Female)? 0   Gender - Male 0=No   STOP-BANG Total Score 0            Assessment and Plan:   1.  Painful orthopedic hardware   Plan: Removal of painful hardware right pelvis 02/16/2024  2.  Anxiety and depression-controlled with medication  3.  ASA 2       Caprini DVT score 2       Stop bang score 0       CHADS2 score 0       DASI score 58.2       METS score 9.9       RCRI score 0       Apfel score 3       ARISCAT score 17 low risk=1.6% risk of postop pulmonary complication    4.  Labs ordered by Dr. Dick CBC, BMP and coag screen

## 2024-02-09 NOTE — H&P (VIEW-ONLY)
CPM/PAT Evaluation       [facility]  /Age: 1995/ y.o.     In-Person       Chief Complaint: Painful hardware    HPI 28-year-old female with history of congenital hip dysplasia.  Patient underwent hip arthroscopy with labral repair 2023.  Patient complains of painful hardware.  She denies fevers or chills.  Patient has history of anxiety, depression and anorexia nervosa.  Patient is scheduled for removal of painful hardware right pelvis 2024    Past Medical History:   Diagnosis Date    Anorexia nervosa     Anxiety and depression     Dysphonia 2015    Hoarseness    Encounter for gynecological examination (general) (routine) without abnormal findings 2021    Well woman exam with routine gynecological exam    Encounter for gynecological examination (general) (routine) without abnormal findings 11/15/2017    Well woman exam with routine gynecological exam    Encounter for gynecological examination (general) (routine) without abnormal findings 2020    Well woman exam with routine gynecological exam    Personal history of other diseases of the respiratory system 2015    History of asthma    PONV (postoperative nausea and vomiting)        Past Surgical History:   Procedure Laterality Date    HIP ARTHROSCOPY W/ LABRAL REPAIR Right     OVARIAN CYST REMOVAL      TONSILLECTOMY  11/15/2017    Tonsillectomy       Patient  has no history on file for sexual activity.    Family History   Problem Relation Name Age of Onset    Diabetes Father      Hypothyroidism Sister      Cancer Other Grandmother     Diabetes Other Grandfather        Allergies   Allergen Reactions    Reglan [Metoclopramide Hcl] Other     Pt gets really shaky     Adhesive Tape-Silicones Rash       Current Outpatient Medications   Medication Instructions    b complex 0.4 mg tablet 1 tablet, oral, Daily    cholecalciferol (VITAMIN D3) 2,000 Units, oral, Daily    escitalopram (LEXAPRO) 10 mg,  "oral, Daily RT    levonorgestrel (Mirena) 21 mcg/24 hours (8 yrs) 52 mg IUD 1 each, intrauterine, Once    magnesium oxide (MAG-OX) 400 mg, oral, Daily    omega 3-dha-epa-fish oil (Fish OiL) 1,000 mg (120 mg-180 mg) capsule 2 capsules, oral, Daily     Review of Systems   Eyes:         Patient wears glasses   All other systems reviewed and are negative.       Physical Exam  Vitals reviewed. Physical exam within normal limits.   Constitutional:       Appearance: Normal appearance.   HENT:      Head: Normocephalic and atraumatic.      Mouth/Throat:      Mouth: Mucous membranes are moist.   Eyes:      Extraocular Movements: Extraocular movements intact.      Pupils: Pupils are equal, round, and reactive to light.   Cardiovascular:      Rate and Rhythm: Normal rate and regular rhythm.      Pulses: Normal pulses.      Heart sounds: Normal heart sounds.   Pulmonary:      Effort: Pulmonary effort is normal.      Breath sounds: Normal breath sounds.   Abdominal:      General: Bowel sounds are normal.      Palpations: Abdomen is soft.   Musculoskeletal:         General: Normal range of motion.      Cervical back: Normal range of motion.   Lymphadenopathy:      Cervical: No cervical adenopathy.   Skin:     General: Skin is warm and dry.   Neurological:      General: No focal deficit present.      Mental Status: She is alert and oriented to person, place, and time.   Psychiatric:         Mood and Affect: Mood normal.         Behavior: Behavior normal.         Thought Content: Thought content normal.         Judgment: Judgment normal.          PAT AIRWAY:   Airway:     Mallampati::  I    Neck ROM::  Full      Vitals  Heart Rate:  [67]   Temp:  [37 °C (98.6 °F)]   BP: (128)/(71)   Height:  [170.2 cm (5' 7\")]   Weight:  [76.3 kg (168 lb 3.4 oz)]   SpO2:  [100 %]        DASI Risk Score      Flowsheet Row Most Recent Value   DASI SCORE 58.2   METS Score (Will be calculated only when all the questions are answered) 9.9      "     Caprini DVT Assessment      Flowsheet Row Most Recent Value   DVT Score 2   History Prior major surgery   Age Less than 40 years   BMI 30 or less          Modified Frailty Index    No data to display       CHADS2 Stroke Risk  Current as of 9 minutes ago        N/A 3 - 100%: High Risk   2 - 3%: Medium Risk   0 - 2%: Low Risk     Last Change: N/A          This score determines the patient's risk of having a stroke if the patient has atrial fibrillation.        This score is not applicable to this patient. Components are not calculated.          Revised Cardiac Risk Index      Flowsheet Row Most Recent Value   Revised Cardiac Risk Calculator 0          Apfel Simplified Score      Flowsheet Row Most Recent Value   Apfel Simplified Score Calculator 3          Risk Analysis Index Results This Encounter    No data found in the last 1 encounters.       Stop Bang Score      Flowsheet Row Most Recent Value   Do you snore loudly? 0   Do you often feel tired or fatigued after your sleep? 0   Has anyone ever observed you stop breathing in your sleep? 0   Do you have or are you being treated for high blood pressure? 0   Recent BMI (Calculated) 26.3   Is BMI greater than 35 kg/m2? 0=No   Age older than 50 years old? 0=No   Is your neck circumference greater than 17 inches (Male) or 16 inches (Female)? 0   Gender - Male 0=No   STOP-BANG Total Score 0            Assessment and Plan:   1.  Painful orthopedic hardware   Plan: Removal of painful hardware right pelvis 02/16/2024  2.  Anxiety and depression-controlled with medication  3.  ASA 2       Caprini DVT score 2       Stop bang score 0       CHADS2 score 0       DASI score 58.2       METS score 9.9       RCRI score 0       Apfel score 3       ARISCAT score 17 low risk=1.6% risk of postop pulmonary complication    4.  Labs ordered by Dr. Dick CBC, BMP and coag screen

## 2024-02-14 ENCOUNTER — ANESTHESIA EVENT (OUTPATIENT)
Dept: OPERATING ROOM | Facility: HOSPITAL | Age: 29
End: 2024-02-14
Payer: COMMERCIAL

## 2024-02-16 ENCOUNTER — HOSPITAL ENCOUNTER (OUTPATIENT)
Facility: HOSPITAL | Age: 29
Setting detail: OUTPATIENT SURGERY
Discharge: HOME | End: 2024-02-16
Attending: ORTHOPAEDIC SURGERY | Admitting: ORTHOPAEDIC SURGERY
Payer: COMMERCIAL

## 2024-02-16 ENCOUNTER — APPOINTMENT (OUTPATIENT)
Dept: RADIOLOGY | Facility: HOSPITAL | Age: 29
End: 2024-02-16
Payer: COMMERCIAL

## 2024-02-16 ENCOUNTER — ANESTHESIA (OUTPATIENT)
Dept: OPERATING ROOM | Facility: HOSPITAL | Age: 29
End: 2024-02-16
Payer: COMMERCIAL

## 2024-02-16 VITALS
HEART RATE: 70 BPM | SYSTOLIC BLOOD PRESSURE: 117 MMHG | RESPIRATION RATE: 16 BRPM | DIASTOLIC BLOOD PRESSURE: 72 MMHG | HEIGHT: 67 IN | BODY MASS INDEX: 26.4 KG/M2 | OXYGEN SATURATION: 100 % | TEMPERATURE: 98.2 F | WEIGHT: 168.21 LBS

## 2024-02-16 DIAGNOSIS — F41.9 ANXIETY: ICD-10-CM

## 2024-02-16 DIAGNOSIS — T84.84XA PAINFUL ORTHOPAEDIC HARDWARE (CMS-HCC): ICD-10-CM

## 2024-02-16 DIAGNOSIS — Q65.89 HIP DYSPLASIA (HHS-HCC): Primary | ICD-10-CM

## 2024-02-16 LAB — HCG UR QL IA.RAPID: NEGATIVE

## 2024-02-16 PROCEDURE — A20680 PR REMOVAL DEEP IMPLANT: Performed by: ANESTHESIOLOGY

## 2024-02-16 PROCEDURE — A20680 PR REMOVAL DEEP IMPLANT: Performed by: ANESTHESIOLOGIST ASSISTANT

## 2024-02-16 PROCEDURE — 7100000002 HC RECOVERY ROOM TIME - EACH INCREMENTAL 1 MINUTE: Performed by: ORTHOPAEDIC SURGERY

## 2024-02-16 PROCEDURE — 3600000002 HC OR TIME - INITIAL BASE CHARGE - PROCEDURE LEVEL TWO: Performed by: ORTHOPAEDIC SURGERY

## 2024-02-16 PROCEDURE — 7100000009 HC PHASE TWO TIME - INITIAL BASE CHARGE: Performed by: ORTHOPAEDIC SURGERY

## 2024-02-16 PROCEDURE — 7100000001 HC RECOVERY ROOM TIME - INITIAL BASE CHARGE: Performed by: ORTHOPAEDIC SURGERY

## 2024-02-16 PROCEDURE — 7100000010 HC PHASE TWO TIME - EACH INCREMENTAL 1 MINUTE: Performed by: ORTHOPAEDIC SURGERY

## 2024-02-16 PROCEDURE — 2720000007 HC OR 272 NO HCPCS: Performed by: ORTHOPAEDIC SURGERY

## 2024-02-16 PROCEDURE — 3700000002 HC GENERAL ANESTHESIA TIME - EACH INCREMENTAL 1 MINUTE: Performed by: ORTHOPAEDIC SURGERY

## 2024-02-16 PROCEDURE — 2500000005 HC RX 250 GENERAL PHARMACY W/O HCPCS: Performed by: ORTHOPAEDIC SURGERY

## 2024-02-16 PROCEDURE — 2500000005 HC RX 250 GENERAL PHARMACY W/O HCPCS: Performed by: ANESTHESIOLOGIST ASSISTANT

## 2024-02-16 PROCEDURE — 2500000004 HC RX 250 GENERAL PHARMACY W/ HCPCS (ALT 636 FOR OP/ED): Performed by: ANESTHESIOLOGIST ASSISTANT

## 2024-02-16 PROCEDURE — 2500000004 HC RX 250 GENERAL PHARMACY W/ HCPCS (ALT 636 FOR OP/ED): Performed by: ORTHOPAEDIC SURGERY

## 2024-02-16 PROCEDURE — 20680 REMOVAL OF IMPLANT DEEP: CPT | Performed by: PHYSICIAN ASSISTANT

## 2024-02-16 PROCEDURE — 2500000004 HC RX 250 GENERAL PHARMACY W/ HCPCS (ALT 636 FOR OP/ED): Performed by: ANESTHESIOLOGY

## 2024-02-16 PROCEDURE — 2500000004 HC RX 250 GENERAL PHARMACY W/ HCPCS (ALT 636 FOR OP/ED): Performed by: PHYSICIAN ASSISTANT

## 2024-02-16 PROCEDURE — 3600000007 HC OR TIME - EACH INCREMENTAL 1 MINUTE - PROCEDURE LEVEL TWO: Performed by: ORTHOPAEDIC SURGERY

## 2024-02-16 PROCEDURE — 20680 REMOVAL OF IMPLANT DEEP: CPT | Performed by: ORTHOPAEDIC SURGERY

## 2024-02-16 PROCEDURE — 81025 URINE PREGNANCY TEST: CPT | Performed by: PHYSICIAN ASSISTANT

## 2024-02-16 PROCEDURE — 3700000001 HC GENERAL ANESTHESIA TIME - INITIAL BASE CHARGE: Performed by: ORTHOPAEDIC SURGERY

## 2024-02-16 RX ORDER — SODIUM CHLORIDE 9 MG/ML
100 INJECTION, SOLUTION INTRAVENOUS CONTINUOUS
Status: DISCONTINUED | OUTPATIENT
Start: 2024-02-16 | End: 2024-02-16 | Stop reason: HOSPADM

## 2024-02-16 RX ORDER — OXYCODONE HYDROCHLORIDE 5 MG/1
5 TABLET ORAL EVERY 6 HOURS PRN
Qty: 12 TABLET | Refills: 0 | Status: SHIPPED | OUTPATIENT
Start: 2024-02-16 | End: 2024-02-20

## 2024-02-16 RX ORDER — FENTANYL CITRATE 50 UG/ML
INJECTION, SOLUTION INTRAMUSCULAR; INTRAVENOUS AS NEEDED
Status: DISCONTINUED | OUTPATIENT
Start: 2024-02-16 | End: 2024-02-16

## 2024-02-16 RX ORDER — TRANEXAMIC ACID 100 MG/ML
INJECTION, SOLUTION INTRAVENOUS AS NEEDED
Status: DISCONTINUED | OUTPATIENT
Start: 2024-02-16 | End: 2024-02-16

## 2024-02-16 RX ORDER — CEPHALEXIN 500 MG/1
500 CAPSULE ORAL 3 TIMES DAILY
Qty: 3 CAPSULE | Refills: 0 | Status: SHIPPED | OUTPATIENT
Start: 2024-02-16 | End: 2024-02-17

## 2024-02-16 RX ORDER — PROPOFOL 10 MG/ML
INJECTION, EMULSION INTRAVENOUS AS NEEDED
Status: DISCONTINUED | OUTPATIENT
Start: 2024-02-16 | End: 2024-02-16

## 2024-02-16 RX ORDER — BUPIVACAINE HYDROCHLORIDE 5 MG/ML
INJECTION, SOLUTION PERINEURAL AS NEEDED
Status: DISCONTINUED | OUTPATIENT
Start: 2024-02-16 | End: 2024-02-16 | Stop reason: HOSPADM

## 2024-02-16 RX ORDER — HYDRALAZINE HYDROCHLORIDE 20 MG/ML
5 INJECTION INTRAMUSCULAR; INTRAVENOUS EVERY 30 MIN PRN
Status: DISCONTINUED | OUTPATIENT
Start: 2024-02-16 | End: 2024-02-16 | Stop reason: HOSPADM

## 2024-02-16 RX ORDER — MIDAZOLAM HYDROCHLORIDE 1 MG/ML
INJECTION, SOLUTION INTRAMUSCULAR; INTRAVENOUS AS NEEDED
Status: DISCONTINUED | OUTPATIENT
Start: 2024-02-16 | End: 2024-02-16

## 2024-02-16 RX ORDER — SCOLOPAMINE TRANSDERMAL SYSTEM 1 MG/1
1 PATCH, EXTENDED RELEASE TRANSDERMAL ONCE
Status: DISCONTINUED | OUTPATIENT
Start: 2024-02-16 | End: 2024-02-16 | Stop reason: HOSPADM

## 2024-02-16 RX ORDER — CEFAZOLIN SODIUM 2 G/100ML
2 INJECTION, SOLUTION INTRAVENOUS ONCE
Status: DISCONTINUED | OUTPATIENT
Start: 2024-02-16 | End: 2024-02-16 | Stop reason: SDUPTHER

## 2024-02-16 RX ORDER — ASPIRIN 81 MG
100 TABLET, DELAYED RELEASE (ENTERIC COATED) ORAL 2 TIMES DAILY
Qty: 10 TABLET | Refills: 0 | Status: SHIPPED | OUTPATIENT
Start: 2024-02-16 | End: 2024-02-21

## 2024-02-16 RX ORDER — VANCOMYCIN HYDROCHLORIDE 1 G/20ML
INJECTION, POWDER, LYOPHILIZED, FOR SOLUTION INTRAVENOUS AS NEEDED
Status: DISCONTINUED | OUTPATIENT
Start: 2024-02-16 | End: 2024-02-16 | Stop reason: HOSPADM

## 2024-02-16 RX ORDER — CEFAZOLIN SODIUM 2 G/100ML
2 INJECTION, SOLUTION INTRAVENOUS ONCE
Status: COMPLETED | OUTPATIENT
Start: 2024-02-16 | End: 2024-02-16

## 2024-02-16 RX ORDER — ASPIRIN 81 MG/1
81 TABLET ORAL 2 TIMES DAILY
Qty: 28 TABLET | Refills: 0 | Status: SHIPPED | OUTPATIENT
Start: 2024-02-16 | End: 2024-03-01

## 2024-02-16 RX ORDER — SODIUM CHLORIDE, SODIUM LACTATE, POTASSIUM CHLORIDE, CALCIUM CHLORIDE 600; 310; 30; 20 MG/100ML; MG/100ML; MG/100ML; MG/100ML
100 INJECTION, SOLUTION INTRAVENOUS CONTINUOUS
Status: DISCONTINUED | OUTPATIENT
Start: 2024-02-16 | End: 2024-02-16 | Stop reason: HOSPADM

## 2024-02-16 RX ORDER — ALBUTEROL SULFATE 0.83 MG/ML
2.5 SOLUTION RESPIRATORY (INHALATION) ONCE AS NEEDED
Status: DISCONTINUED | OUTPATIENT
Start: 2024-02-16 | End: 2024-02-16 | Stop reason: HOSPADM

## 2024-02-16 RX ORDER — ONDANSETRON HYDROCHLORIDE 2 MG/ML
INJECTION, SOLUTION INTRAVENOUS AS NEEDED
Status: DISCONTINUED | OUTPATIENT
Start: 2024-02-16 | End: 2024-02-16

## 2024-02-16 RX ORDER — FENTANYL CITRATE 50 UG/ML
50 INJECTION, SOLUTION INTRAMUSCULAR; INTRAVENOUS EVERY 5 MIN PRN
Status: DISCONTINUED | OUTPATIENT
Start: 2024-02-16 | End: 2024-02-16 | Stop reason: HOSPADM

## 2024-02-16 RX ORDER — ONDANSETRON HYDROCHLORIDE 2 MG/ML
4 INJECTION, SOLUTION INTRAVENOUS ONCE AS NEEDED
Status: DISCONTINUED | OUTPATIENT
Start: 2024-02-16 | End: 2024-02-16 | Stop reason: HOSPADM

## 2024-02-16 RX ORDER — DEXAMETHASONE SODIUM PHOSPHATE 4 MG/ML
INJECTION, SOLUTION INTRA-ARTICULAR; INTRALESIONAL; INTRAMUSCULAR; INTRAVENOUS; SOFT TISSUE AS NEEDED
Status: DISCONTINUED | OUTPATIENT
Start: 2024-02-16 | End: 2024-02-16

## 2024-02-16 RX ORDER — LABETALOL HYDROCHLORIDE 5 MG/ML
5 INJECTION, SOLUTION INTRAVENOUS ONCE AS NEEDED
Status: DISCONTINUED | OUTPATIENT
Start: 2024-02-16 | End: 2024-02-16 | Stop reason: HOSPADM

## 2024-02-16 RX ADMIN — CEFAZOLIN SODIUM 2 G: 2 INJECTION, SOLUTION INTRAVENOUS at 09:47

## 2024-02-16 RX ADMIN — SCOPALAMINE 1 PATCH: 1 PATCH, EXTENDED RELEASE TRANSDERMAL at 08:36

## 2024-02-16 RX ADMIN — SODIUM CHLORIDE 100 ML/HR: 900 INJECTION, SOLUTION INTRAVENOUS at 08:35

## 2024-02-16 RX ADMIN — FENTANYL CITRATE 50 MCG: 50 INJECTION INTRAMUSCULAR; INTRAVENOUS at 09:49

## 2024-02-16 RX ADMIN — FENTANYL CITRATE 50 MCG: 50 INJECTION INTRAMUSCULAR; INTRAVENOUS at 09:42

## 2024-02-16 RX ADMIN — ONDANSETRON 4 MG: 2 INJECTION INTRAMUSCULAR; INTRAVENOUS at 09:42

## 2024-02-16 RX ADMIN — MIDAZOLAM 2 MG: 1 INJECTION INTRAMUSCULAR; INTRAVENOUS at 09:46

## 2024-02-16 RX ADMIN — DEXAMETHASONE SODIUM PHOSPHATE 4 MG: 4 INJECTION, SOLUTION INTRAMUSCULAR; INTRAVENOUS at 09:49

## 2024-02-16 RX ADMIN — PROPOFOL 200 MG: 10 INJECTION, EMULSION INTRAVENOUS at 09:42

## 2024-02-16 RX ADMIN — TRANEXAMIC ACID 1000 MG: 1 INJECTION, SOLUTION INTRAVENOUS at 09:42

## 2024-02-16 SDOH — HEALTH STABILITY: MENTAL HEALTH: CURRENT SMOKER: 1

## 2024-02-16 ASSESSMENT — PAIN SCALES - GENERAL
PAIN_LEVEL: 2
PAINLEVEL_OUTOF10: 3
PAINLEVEL_OUTOF10: 3
PAINLEVEL_OUTOF10: 1
PAINLEVEL_OUTOF10: 3
PAINLEVEL_OUTOF10: 0 - NO PAIN

## 2024-02-16 ASSESSMENT — PAIN - FUNCTIONAL ASSESSMENT
PAIN_FUNCTIONAL_ASSESSMENT: 0-10

## 2024-02-16 ASSESSMENT — PAIN DESCRIPTION - DESCRIPTORS
DESCRIPTORS: ACHING
DESCRIPTORS: DULL
DESCRIPTORS: ACHING
DESCRIPTORS: ACHING

## 2024-02-16 ASSESSMENT — COLUMBIA-SUICIDE SEVERITY RATING SCALE - C-SSRS
1. IN THE PAST MONTH, HAVE YOU WISHED YOU WERE DEAD OR WISHED YOU COULD GO TO SLEEP AND NOT WAKE UP?: NO
2. HAVE YOU ACTUALLY HAD ANY THOUGHTS OF KILLING YOURSELF?: NO
6. HAVE YOU EVER DONE ANYTHING, STARTED TO DO ANYTHING, OR PREPARED TO DO ANYTHING TO END YOUR LIFE?: NO

## 2024-02-16 NOTE — POST-PROCEDURE NOTE
Pt arrived to phase 2, denies n/v, dizziness, or lightheadedness at this time.  to bedside and pt dressed and assisted to chair.

## 2024-02-16 NOTE — ANESTHESIA PROCEDURE NOTES
Airway  Date/Time: 2/16/2024 9:45 AM  Urgency: elective    Airway not difficult    Staffing  Performed: CAA   Authorized by: Jacob Miranda MD    Performed by: SHIN Downing  Patient location during procedure: OR    Indications and Patient Condition  Indications for airway management: anesthesia  Spontaneous Ventilation: absent      Final Airway Details  Final airway type: supraglottic airway      Successful airway: Size 3     Number of attempts at approach: 1

## 2024-02-16 NOTE — PERIOPERATIVE NURSING NOTE
Patient alert and oriented. States mild aching pain at site, but tolerable per patient. Patient tolerating PO fluids with no issues. VS stable. Surgical site intact with ice pack applied.

## 2024-02-16 NOTE — ANESTHESIA POSTPROCEDURE EVALUATION
Patient: Zara Bridgesanic    Procedure Summary       Date: 02/16/24 Room / Location: [facility] / [facility]    Anesthesia Start: 0940 Anesthesia Stop: 1010    Procedure: Removal Hardware Pelvis (hardware removal set) (Right: Hip) Diagnosis:       Painful orthopaedic hardware (CMS/HCC)      (Painful orthopaedic hardware (CMS/HCC) [T84.84XA])    Surgeons: Hernan Dick MD Responsible Provider: Jacob Miranda MD    Anesthesia Type: general ASA Status: 2            Anesthesia Type: general    Vitals Value Taken Time   /74 02/16/24 1010   Temp 36 02/16/24 1010   Pulse 78 02/16/24 1010   Resp 18 02/16/24 1010   SpO2 100 02/16/24 1010       Anesthesia Post Evaluation    Patient location during evaluation: PACU  Patient participation: complete - patient participated  Level of consciousness: awake and alert  Pain score: 2  Pain management: adequate  Airway patency: patent  Cardiovascular status: acceptable  Respiratory status: acceptable  Hydration status: acceptable  Postoperative Nausea and Vomiting: none        No notable events documented.

## 2024-02-16 NOTE — OP NOTE
Removal Hardware Pelvis (R) Operative Note     Date: 2024  OR Location: [facility]    Name: Zara Corey, : 1995, Age: 28 y.o., MRN: [id number], Sex: female    Diagnosis  Pre-op Diagnosis     * Painful orthopaedic hardware (CMS/HCC) [T84.84XA] Post-op Diagnosis     * Painful orthopaedic hardware (CMS/HCC) [T84.84XA]     Procedures  Removal Hardware Pelvis (hardware removal set)  91558 - ND REMOVAL IMPLANT DEEP    Right Pelvis Hardware Removal    Surgeons      * Hernan Dick - Primary    Resident/Fellow/Other Assistant:  Surgeon(s) and Role:  First Assistant: Tia Tony PA-C, who was critical and essential as a first assistant as there was no qualified resident available.    Procedure Summary  Anesthesia: General  ASA: II  Anesthesia Staff:   Anesthesiologist: Jacob Miranda MD  C-AA: SHIN Downing  Estimated Blood Loss: minimal   Intra-op Medications:   Medication Name Total Dose   BUPivacaine HCl (Marcaine) 0.5 % (5 mg/mL) injection 10 mL   lactated Ringer's infusion Cannot be calculated   ceFAZolin in dextrose (iso-os) (Ancef) IVPB 2 g 2 g              Anesthesia Record               Intraprocedure I/O Totals       None           Specimen: No specimens collected     Staff:   Circulator: Alana Ulloa RN; Brittany Odonnell RN  Scrub Person: Antonia Neal; Janey Starks         Drains and/or Catheters: * None in log *    Tourniquet Times:         Implants:     Findings: please see below    Indications: Zara Corey is an 28 y.o. female who is having surgery for Painful orthopaedic hardware (CMS/HCC) [T84.84XA].     The patient was seen in the preoperative area. The risks, benefits, complications, treatment options, non-operative alternatives, expected recovery and outcomes were discussed with the patient. The possibilities of reaction to medication, pulmonary aspiration, injury to surrounding structures, bleeding, recurrent infection, the need for additional procedures, failure to diagnose a  condition, and creating a complication requiring transfusion or operation were discussed with the patient. The patient concurred with the proposed plan, giving informed consent.  The site of surgery was properly noted/marked if necessary per policy. The patient has been actively warmed in preoperative area. Preoperative antibiotics have been ordered and given within 1 hours of incision. Venous thrombosis prophylaxis have been ordered including unilateral sequential compression device    Procedure Details: Patient was transported to the operating room positioned supine on a flat Sedrick table.  All bony promises were padded.  General anesthesia was induced.  The correct operative site was identified and preoperative timeout was performed prior to prepping.  The limb was then prepped and draped in the sterile fashion.  The incision was opened with a 10 blade scalpel and subcutaneous dissection was performed with the Bovie.  Electrocautery was used to obtain hemostasis.  All 4 screw heads were identified.  We then loosened each screw with a hand screwdriver size 3.5 mm hex.  After the screw was loosened we then removed all 4 screws on power.  Confirmatory x-ray was taken and saved.  The wound was then copiously irrigated and hemostasis was obtained.  Antibiotic was placed deep to prevent infection.  The wound was then closed in layers in a standard fashion and a sterile dressing was applied.  I was present for the key and critical aspects of the procedure.    Please note Tia Tony PA-C, was critical and essential to all aspects of this case as a first assistant as there was no qualified resident able to participate.    Postoperative plan:    Discharged home today in stable condition.  Appropriate postoperative analgesia will be prescribed as well as 24 hours of postoperative oral antibiotics for infection prophylaxis.  DVT prophylaxis will be with aspirin.  We will see her back in the office in 2 weeks time for  wound check.    Complications:  None; patient tolerated the procedure well.    Disposition: PACU - hemodynamically stable.  Condition: stable         Additional Details: none    Attending Attestation: I was present and scrubbed for the key portions of the procedure.    Hernan Dick  Phone Number: [phone]

## 2024-02-16 NOTE — ANESTHESIA PREPROCEDURE EVALUATION
Patient: Zara Tothcmmili    Procedure Information       Date/Time: 02/16/24 0915    Procedure: Removal Hardware Pelvis (hardware removal set) (Right: Hip)    Location: [facility] / [facility]    Surgeons: Hernan Dick MD            Relevant Problems   /Renal   (+) Recurrent UTI      Infectious Disease   (+) Bacterial vaginosis   (+) Recurrent UTI      Other   (+) Lesion of spleen   (+) Tonsillar hypertrophy     Past Surgical History:   Procedure Laterality Date    HIP ARTHROSCOPY W/ LABRAL REPAIR Right     OVARIAN CYST REMOVAL      TONSILLECTOMY  11/15/2017    Tonsillectomy       Clinical information reviewed:   Tobacco  Allergies  Meds   Med Hx  Surg Hx  OB Status  Fam Hx  Soc   Hx        NPO Detail:  NPO/Void Status  Carbohydrate Drink Given Prior to Surgery? : N  Date of Last Liquid: 02/15/24  Time of Last Liquid: 2200  Date of Last Solid: 02/15/24  Time of Last Solid: 2200  Last Intake Type: Clear fluids  Time of Last Void: 0808         Physical Exam    Airway  Mallampati: I  TM distance: >3 FB  Neck ROM: full     Cardiovascular   Comments: deferred   Dental    Pulmonary   Comments: deferred   Abdominal     Comments: deferred           Anesthesia Plan    History of general anesthesia?: yes  History of complications of general anesthesia?: yes    ASA 2     general     The patient is a current smoker.    intravenous induction   Postoperative administration of opioids is intended.  Anesthetic plan and risks discussed with patient.    Plan discussed with CAA.

## 2024-03-05 ENCOUNTER — HOSPITAL ENCOUNTER (OUTPATIENT)
Dept: RADIOLOGY | Facility: CLINIC | Age: 29
Discharge: HOME | End: 2024-03-05
Payer: COMMERCIAL

## 2024-03-05 ENCOUNTER — OFFICE VISIT (OUTPATIENT)
Dept: ORTHOPEDIC SURGERY | Facility: CLINIC | Age: 29
End: 2024-03-05
Payer: COMMERCIAL

## 2024-03-05 DIAGNOSIS — Q65.89 HIP DYSPLASIA (HHS-HCC): ICD-10-CM

## 2024-03-05 DIAGNOSIS — Q65.89 HIP DYSPLASIA (HHS-HCC): Primary | ICD-10-CM

## 2024-03-05 PROCEDURE — 99214 OFFICE O/P EST MOD 30 MIN: CPT | Mod: 24 | Performed by: PHYSICIAN ASSISTANT

## 2024-03-05 PROCEDURE — 99214 OFFICE O/P EST MOD 30 MIN: CPT | Performed by: PHYSICIAN ASSISTANT

## 2024-03-05 PROCEDURE — 72190 X-RAY EXAM OF PELVIS: CPT | Performed by: RADIOLOGY

## 2024-03-05 PROCEDURE — 72190 X-RAY EXAM OF PELVIS: CPT

## 2024-03-05 PROCEDURE — 1036F TOBACCO NON-USER: CPT | Performed by: PHYSICIAN ASSISTANT

## 2024-03-05 ASSESSMENT — PAIN SCALES - GENERAL: PAINLEVEL_OUTOF10: 4

## 2024-03-05 ASSESSMENT — PAIN DESCRIPTION - DESCRIPTORS: DESCRIPTORS: ACHING

## 2024-03-05 ASSESSMENT — PAIN - FUNCTIONAL ASSESSMENT: PAIN_FUNCTIONAL_ASSESSMENT: 0-10

## 2024-03-05 NOTE — PROGRESS NOTES
History of Present Illness   Zara Corey is a 28 y.o. female presenting today for post-op check from removal of R sided REJI screws on 2/16/24. Her pelvic pain related to the screws is resolved and incision healed. However, she has been having severe groin pain on the right. Since surgery she has always had some deep aching pain but since having her screws out she feels she notices the pain more. She has done several months of therapy, with most recent visit being within three months, and pain is not improving. Pain is a constant sensation increased with activity and improved slightly with rest. The pain overall is slightly better than what she was experiencing prior to surgery, but still limiting her activities.     Has also been having increased left hip pain. Pain has been present for a little over a year now and progressively worsening. Feels similar to her right side pre-REJI and described as a constant, throbbing.burning sensation localized to the groin. Sx increase with all activity and improves with nothing. Has recently, within the past three months, had therapy addressing her left sided pain as well without relief.     No other complaints or concerns.        Past Medical History:   Diagnosis Date    Anorexia nervosa     Anxiety and depression     Dysphonia 01/30/2015    Hoarseness    Encounter for gynecological examination (general) (routine) without abnormal findings 06/25/2021    Well woman exam with routine gynecological exam    Encounter for gynecological examination (general) (routine) without abnormal findings 11/15/2017    Well woman exam with routine gynecological exam    Encounter for gynecological examination (general) (routine) without abnormal findings 06/01/2020    Well woman exam with routine gynecological exam    Personal history of other diseases of the respiratory system 01/23/2015    History of asthma    PONV (postoperative nausea and vomiting)        Medication Documentation Review  Audit       Reviewed by Tiffany De Oliveira LPN (Licensed Nurse) on 24 at 1139      Medication Order Taking? Sig Documenting Provider Last Dose Status   aspirin 81 mg EC tablet [id number]  Take 1 tablet (81 mg) by mouth 2 times a day for 14 days. Tia Tony PA-C   24 2359   b complex 0.4 mg tablet [id number] Yes Take 1 tablet by mouth once daily. Historical Provider, MD Taking Active   cholecalciferol (Vitamin D3) 50 mcg (2,000 unit) capsule [id number] Yes Take 1 capsule (50 mcg) by mouth once daily. Historical Provider, MD Taking Active   escitalopram (Lexapro) 10 mg tablet [id number] Yes Take 1 tablet (10 mg) by mouth once daily.   Patient taking differently: Take 1.5 tablets (15 mg) by mouth once daily.    Marisol Bliss MD Taking Active   levonorgestrel (Mirena) 21 mcg/24 hours (8 yrs) 52 mg IUD [id number] Yes 52 mg by intrauterine route 1 time. Historical Provider, MD Taking Active   magnesium oxide (Mag-Ox) 400 mg tablet [id number] Yes Take 1 tablet (400 mg) by mouth once daily. Historical Provider, MD Taking Active   omega 3-dha-epa-fish oil (Fish OiL) 1,000 mg (120 mg-180 mg) capsule [id number] Yes Take 2 capsules (2,000 mg) by mouth once daily. Historical Provider, MD Taking Active                    Allergies   Allergen Reactions    Reglan [Metoclopramide Hcl] Other     Pt gets really shaky     Adhesive Tape-Silicones Rash       Social History     Socioeconomic History    Marital status:      Spouse name: Not on file    Number of children: Not on file    Years of education: Not on file    Highest education level: Not on file   Occupational History    Not on file   Tobacco Use    Smoking status: Never    Smokeless tobacco: Never   Vaping Use    Vaping Use: Never used   Substance and Sexual Activity    Alcohol use: Yes     Alcohol/week: 2.0 standard drinks of alcohol     Types: 2 Glasses of wine per week     Comment: socially    Drug use: Never    Sexual activity: Not on file   Other  Topics Concern    Not on file   Social History Narrative    Not on file     Social Determinants of Health     Financial Resource Strain: Not on file   Food Insecurity: Not on file   Transportation Needs: Not on file   Physical Activity: Not on file   Stress: Not on file   Social Connections: Not on file   Intimate Partner Violence: Not on file   Housing Stability: Not on file       Past Surgical History:   Procedure Laterality Date    HIP ARTHROSCOPY W/ LABRAL REPAIR Right     OVARIAN CYST REMOVAL      TONSILLECTOMY  11/15/2017    Tonsillectomy          Review of Systems:  30 point ROS reviewed and negative other than as listed in the HPI     Physical Exam:  Gen: The pt is A&Ox3, NAD, and appear state age and weight  Psychiatric: mood and affect are appropriate   Eyes: sclera are white, EOM grossly intact  ENT: MMM  Neck: supple, thyroid is midline  Respiratory: respirations are nonlabored, chest rise symmetric  CV: rate is regular by palpation of distal pulses  Abdomen: nondistended   Integument: no obvious cutaneous lesions noted. No signs of lymphangitis. No signs of systemic edema.   MSK:  R hip  surgical incision well healing without edema, erythema, drainage, or other s/sx of infection. Appropriately tender to palpation around the incision. SILT throughout the leg intact. Intact plantarflexion and dorsiflexion. Foot warm and well perfused.      Imaging:  I personally reviewed multiple views of the  pelvis  were obtained in the office today demonstrate maintenance of osteotomy s/p removal of the R REJI screws; she has cross over sign and signs of dysplasia on her left side.      Assessment   28 y.o. female post-op from removal of R sided REJI screws on 2/16/24 with refractory b/l hip pain.      Plan:  Pt with months of b/l hip pain that is refractory to conservative treatment. She has hx of b/l hip dysplasia and R labral tear s/p hip scope with pain in the hip similar to prior to surgery. She has tried therapy  within the past three months without relief; would like to obtain MR arthrogram to assess for repeat tear of the labrum. In regards to her left hip pain, she has had months of pain that has also been refractory to conservative treatments. She has hx of dysplasia on the L. Would obtain MR arthrogram of the left to assess for labral injury. Once imaging complete she will have a combo visit with Dr. Dick and Dr. Dunbar to review.     Natural history reviewed. All of her questions/concerns addressed and she is in agreement with her plan.     All of the patient's questions/concerns address and they are in agreement with the plan.

## 2024-03-12 DIAGNOSIS — F41.9 ANXIETY: ICD-10-CM

## 2024-03-12 RX ORDER — ESCITALOPRAM OXALATE 10 MG/1
15 TABLET ORAL
Qty: 135 TABLET | Refills: 0 | Status: SHIPPED | OUTPATIENT
Start: 2024-03-12 | End: 2024-06-08

## 2024-03-21 ENCOUNTER — HOSPITAL ENCOUNTER (OUTPATIENT)
Dept: RADIOLOGY | Facility: HOSPITAL | Age: 29
Discharge: HOME | End: 2024-03-21
Payer: COMMERCIAL

## 2024-03-21 DIAGNOSIS — Q65.89 HIP DYSPLASIA (HHS-HCC): ICD-10-CM

## 2024-03-21 PROCEDURE — 77002 NEEDLE LOCALIZATION BY XRAY: CPT | Mod: RIGHT SIDE | Performed by: INTERNAL MEDICINE

## 2024-03-21 PROCEDURE — 2550000001 HC RX 255 CONTRASTS: Performed by: PHYSICIAN ASSISTANT

## 2024-03-21 PROCEDURE — 73525 CONTRAST X-RAY OF HIP: CPT | Mod: RT

## 2024-03-21 PROCEDURE — A9575 INJ GADOTERATE MEGLUMI 0.1ML: HCPCS | Performed by: PHYSICIAN ASSISTANT

## 2024-03-21 PROCEDURE — 73722 MRI JOINT OF LWR EXTR W/DYE: CPT | Mod: RIGHT SIDE | Performed by: RADIOLOGY

## 2024-03-21 PROCEDURE — 27093 INJECTION FOR HIP X-RAY: CPT | Mod: RIGHT SIDE | Performed by: INTERNAL MEDICINE

## 2024-03-21 PROCEDURE — 73722 MRI JOINT OF LWR EXTR W/DYE: CPT | Mod: RT

## 2024-03-21 RX ORDER — LIDOCAINE HYDROCHLORIDE 10 MG/ML
4 INJECTION, SOLUTION EPIDURAL; INFILTRATION; INTRACAUDAL; PERINEURAL ONCE
Status: DISCONTINUED | OUTPATIENT
Start: 2024-03-21 | End: 2024-03-22 | Stop reason: HOSPADM

## 2024-03-21 RX ORDER — GADOTERATE MEGLUMINE 376.9 MG/ML
0.2 INJECTION INTRAVENOUS
Status: COMPLETED | OUTPATIENT
Start: 2024-03-21 | End: 2024-03-21

## 2024-03-21 RX ORDER — SODIUM CHLORIDE 0.9 % (FLUSH) 0.9 %
20 SYRINGE (ML) INJECTION EVERY 8 HOURS SCHEDULED
Status: DISCONTINUED | OUTPATIENT
Start: 2024-03-21 | End: 2024-03-22 | Stop reason: HOSPADM

## 2024-03-21 RX ADMIN — IOHEXOL 2 ML: 240 INJECTION, SOLUTION INTRATHECAL; INTRAVASCULAR; INTRAVENOUS; ORAL at 14:51

## 2024-03-21 RX ADMIN — GADOTERATE MEGLUMINE 0.2 ML: 376.9 INJECTION INTRAVENOUS at 14:51

## 2024-03-21 NOTE — POST-PROCEDURE NOTE
Interventional Radiology Brief Postprocedure Note    Attending: Layla Avila MD    Assistant: Brad Dalton MD    Diagnosis: Right hip pain s/p REJI    Description of procedure:  Technically successful right hip joint arthrogram.      Anesthesia:  Local    Complications: None    Estimated Blood Loss: minimal    Medications (Filter: Administrations occurring from 1508 to 1508 on 03/21/24) As of 03/21/24 1508      None          No specimens collected      See detailed result report with images in PACS.    The patient tolerated the procedure well without incident or complication and is in stable condition.

## 2024-03-27 ENCOUNTER — HOSPITAL ENCOUNTER (OUTPATIENT)
Dept: RADIOLOGY | Facility: HOSPITAL | Age: 29
Discharge: HOME | End: 2024-03-27
Payer: COMMERCIAL

## 2024-03-27 DIAGNOSIS — Q65.89 HIP DYSPLASIA (HHS-HCC): ICD-10-CM

## 2024-03-27 PROCEDURE — 77002 NEEDLE LOCALIZATION BY XRAY: CPT | Mod: LEFT SIDE | Performed by: INTERNAL MEDICINE

## 2024-03-27 PROCEDURE — 73722 MRI JOINT OF LWR EXTR W/DYE: CPT | Mod: LEFT SIDE | Performed by: RADIOLOGY

## 2024-03-27 PROCEDURE — 27093 INJECTION FOR HIP X-RAY: CPT | Mod: LT

## 2024-03-27 PROCEDURE — A9575 INJ GADOTERATE MEGLUMI 0.1ML: HCPCS | Performed by: PHYSICIAN ASSISTANT

## 2024-03-27 PROCEDURE — 27093 INJECTION FOR HIP X-RAY: CPT | Mod: LEFT SIDE | Performed by: INTERNAL MEDICINE

## 2024-03-27 PROCEDURE — 2550000001 HC RX 255 CONTRASTS: Performed by: PHYSICIAN ASSISTANT

## 2024-03-27 RX ORDER — GADOTERATE MEGLUMINE 376.9 MG/ML
0.1 INJECTION INTRAVENOUS
Status: COMPLETED | OUTPATIENT
Start: 2024-03-27 | End: 2024-03-27

## 2024-03-27 RX ORDER — LIDOCAINE HYDROCHLORIDE 10 MG/ML
4 INJECTION, SOLUTION EPIDURAL; INFILTRATION; INTRACAUDAL; PERINEURAL ONCE
Status: CANCELLED | OUTPATIENT
Start: 2024-03-27 | End: 2024-03-27

## 2024-03-27 RX ORDER — SODIUM CHLORIDE 9 MG/ML
10 INJECTION, SOLUTION INTRAMUSCULAR; INTRAVENOUS; SUBCUTANEOUS EVERY 8 HOURS SCHEDULED
Status: CANCELLED | OUTPATIENT
Start: 2024-03-27

## 2024-03-27 RX ADMIN — IOHEXOL 3 ML: 240 INJECTION, SOLUTION INTRATHECAL; INTRAVASCULAR; INTRAVENOUS; ORAL at 14:47

## 2024-03-27 RX ADMIN — GADOTERATE MEGLUMINE 0.1 ML: 376.9 INJECTION INTRAVENOUS at 14:46

## 2024-05-01 ENCOUNTER — OFFICE VISIT (OUTPATIENT)
Dept: ORTHOPEDIC SURGERY | Facility: CLINIC | Age: 29
End: 2024-05-01
Payer: COMMERCIAL

## 2024-05-01 ENCOUNTER — OFFICE VISIT (OUTPATIENT)
Dept: ORTHOPEDIC SURGERY | Facility: HOSPITAL | Age: 29
End: 2024-05-01
Payer: COMMERCIAL

## 2024-05-01 ENCOUNTER — HOSPITAL ENCOUNTER (OUTPATIENT)
Dept: RADIOLOGY | Facility: HOSPITAL | Age: 29
Discharge: HOME | End: 2024-05-01
Payer: COMMERCIAL

## 2024-05-01 VITALS — WEIGHT: 168 LBS | BODY MASS INDEX: 26.37 KG/M2 | HEIGHT: 67 IN

## 2024-05-01 DIAGNOSIS — M25.551 BILATERAL HIP PAIN: ICD-10-CM

## 2024-05-01 DIAGNOSIS — M25.552 BILATERAL HIP PAIN: ICD-10-CM

## 2024-05-01 DIAGNOSIS — K66.0 POSTOPERATIVE ADHESIONS: ICD-10-CM

## 2024-05-01 DIAGNOSIS — M25.551 POSTERIOR PAIN OF HIP, RIGHT: Primary | ICD-10-CM

## 2024-05-01 PROCEDURE — 99213 OFFICE O/P EST LOW 20 MIN: CPT | Performed by: ORTHOPAEDIC SURGERY

## 2024-05-01 PROCEDURE — 1036F TOBACCO NON-USER: CPT | Performed by: ORTHOPAEDIC SURGERY

## 2024-05-01 PROCEDURE — 73523 X-RAY EXAM HIPS BI 5/> VIEWS: CPT

## 2024-05-01 PROCEDURE — 99213 OFFICE O/P EST LOW 20 MIN: CPT | Mod: 24 | Performed by: ORTHOPAEDIC SURGERY

## 2024-05-01 PROCEDURE — 73523 X-RAY EXAM HIPS BI 5/> VIEWS: CPT | Mod: BILATERAL PROCEDURE | Performed by: RADIOLOGY

## 2024-05-01 ASSESSMENT — PAIN - FUNCTIONAL ASSESSMENT: PAIN_FUNCTIONAL_ASSESSMENT: 0-10

## 2024-05-01 ASSESSMENT — PAIN SCALES - GENERAL: PAINLEVEL_OUTOF10: 2

## 2024-05-01 ASSESSMENT — PAIN DESCRIPTION - DESCRIPTORS: DESCRIPTORS: ACHING

## 2024-05-01 NOTE — PROGRESS NOTES
28-year-old female almost 3 months out from right-sided screw removal.  She has had some posterior pain since then. Somewhat to the lateral side but mostly direct posterior near the ischial tuberosity. She is also concerned about her left side which has been symptomatic but would like to get her right side sorted out fully before proceeding on her left.  She has had recent MR arthrograms of both sides.    The patient does not endorse fevers and chills. The patient does not endorse any change in her vision or hearing. They do not endorse chest pain, shortness of breath. The patient does not endorse any abdominal discomfort. They do not endorse any skin irritation or lesions. They do not endorse any new numbness and tingling or as otherwise stated in the history of present illness.    She is in no acute distress, alert and oriented x 3.    Mood and affect are appropriate.    Respirations are unlabored.    Distal limb is pink and well perfused.    Right lower extremity evaluation demonstrates pain to palpation in the region of the ischial tuberosity.  Some discomfort with rotational movements of the leg but more so with palpation.  No pain over the trochanter.  Negative anterior impingement test.  Pain is reproduced lightly with straight leg examination posteriorly  Focused over the ischial tuberosity.    Multiple views of the hip and pelvis as well as MRIs of both signs were reviewed and personally interpreted.  She has some slight inflammation and edema in the quadratus femoris and ischiofemoral space on the right and the left side demonstrates labral tear with signs of impingement.  Right side also looks like her labrum is healed without any chondral injury.    28-year-old female with right-sided postsurgical posterior hip pain that could be ischiofemoral impingement versus posterior musculoskeletal pain.  I would recommend an evaluation by Dr. Dunbar which she is seeing him today as well as a ischiofemoral space  injection which we can arrange for her.  I will see her back for routine follow-up in 6 months with AP standing pelvis x-ray and Judet's.  If there is concern for ongoing ischial femoral impingement symptoms despite treatment we could CT scan her femur to assess for version but clinically it did not appear to be a major problem.    Natural History reviewed. All questions answered. The patient was in agreement with the plan.      **This note was created using voice recognition software and was not corrected for typographical or grammatical errors.**

## 2024-05-03 NOTE — PROGRESS NOTES
Patient returns to see me today status post combined hip arthroscopy and periacetabular osteotomy on the right side.  She also has pain on the left.  In regard to side she had surgery on she is not quite achieved an optimal outcome at this point she continues to have a significant amount of stiffness and some discomfort in the groin area but also wrapping around posteriorly.  Her x-rays show that her osteotomy is well-healed she has excellent coverage now after her REJI and she has no evidence of residual cam deformity.    Her exam today reveals that she does have a bit of some tightness of her hip I think this is most consistent with some adhesions.  She has some soft tissue irritation as well.  She does have some discomfort with the FADIR    X-rays show adequate compression of her cam morphology with normal offset and alpha angle.  She has excellent coverage after her REJI.  She does have an MRI arthrogram that shows likely some significant capsule labral adhesions and a little bit of blunting of the free edge of the labrum but overall the labrum appears to be reasonably well-healed no progression of osteoarthritic disease    At this point I think the patient is suffering mostly from some capsule labral adhesions leading to discomfort in her hip we discussed with her the option for a diagnostic and therapeutic corticosteroid injection and a 3 to week course of physical therapy to see if she can break through some of the adhesions.  If she gets relief from the injection but is unable to achieve an optimal outcome she could be a candidate for an arthroscopy with lysis of adhesions and evaluation of her labral repair.  Will plan to see her back in about a month for clinical recheck after she undergoes the injection and does physical therapy

## 2024-05-08 ENCOUNTER — HOSPITAL ENCOUNTER (OUTPATIENT)
Dept: RADIOLOGY | Facility: EXTERNAL LOCATION | Age: 29
Discharge: HOME | End: 2024-05-08

## 2024-05-08 ENCOUNTER — OFFICE VISIT (OUTPATIENT)
Dept: ORTHOPEDIC SURGERY | Facility: HOSPITAL | Age: 29
End: 2024-05-08
Payer: COMMERCIAL

## 2024-05-08 DIAGNOSIS — M25.551 POSTERIOR PAIN OF HIP, RIGHT: ICD-10-CM

## 2024-05-08 DIAGNOSIS — K66.0 POSTOPERATIVE ADHESIONS: Primary | ICD-10-CM

## 2024-05-08 PROCEDURE — 20611 DRAIN/INJ JOINT/BURSA W/US: CPT | Mod: RT | Performed by: PHYSICIAN ASSISTANT

## 2024-05-08 PROCEDURE — 2500000004 HC RX 250 GENERAL PHARMACY W/ HCPCS (ALT 636 FOR OP/ED): Performed by: PHYSICIAN ASSISTANT

## 2024-05-08 PROCEDURE — 2500000005 HC RX 250 GENERAL PHARMACY W/O HCPCS: Performed by: PHYSICIAN ASSISTANT

## 2024-05-08 RX ORDER — METHYLPREDNISOLONE ACETATE 40 MG/ML
40 INJECTION, SUSPENSION INTRA-ARTICULAR; INTRALESIONAL; INTRAMUSCULAR; SOFT TISSUE
Status: COMPLETED | OUTPATIENT
Start: 2024-05-08 | End: 2024-05-08

## 2024-05-08 RX ORDER — LIDOCAINE HYDROCHLORIDE 10 MG/ML
4 INJECTION INFILTRATION; PERINEURAL
Status: COMPLETED | OUTPATIENT
Start: 2024-05-08 | End: 2024-05-08

## 2024-05-08 RX ADMIN — METHYLPREDNISOLONE ACETATE 40 MG: 40 INJECTION, SUSPENSION INTRA-ARTICULAR; INTRALESIONAL; INTRAMUSCULAR; INTRASYNOVIAL; SOFT TISSUE at 15:16

## 2024-05-08 RX ADMIN — LIDOCAINE HYDROCHLORIDE 4 ML: 10 INJECTION, SOLUTION INFILTRATION; PERINEURAL at 15:16

## 2024-05-08 NOTE — PROGRESS NOTES
Right hip    Patient is here today regarding her right hip.  She had a combined hip arthroscopy and periacetabular osteotomy.  Overall she did improve but she continues to have some discomfort and stiffness in the front and back of the hip.  She recently saw Dr. Dunbar who suggested an intra-articular injection.  She would like to proceed with that option and she will also start physical therapy.        L Inj/Asp: R hip joint on 5/8/2024 3:16 PM  Indications: pain  Details: 20 G needle, ultrasound-guided anterior approach  Medications: 40 mg methylPREDNISolone acetate 40 mg/mL; 4 mL lidocaine 10 mg/mL (1 %)    After consent was obtained, the anterior right hip was prepped in a sterile fashion. Ultrasound guidance was used to help insure proper needle placement into the hip joint, decrease patient discomfort, and decrease collateral damage. The joint was aspirated and Depo 40 mg with lidocaine 4cc were injected without any complications. Ultrasound images were saved on an internal file for later reference. The patient tolerated the procedure well and the area was cleaned and bandaged.    Procedure, treatment alternatives, risks and benefits explained, specific risks discussed. Consent was given by the patient. Immediately prior to procedure a time out was called to verify the correct patient, procedure, equipment, support staff and site/side marked as required. Patient was prepped and draped in the usual sterile fashion.                 Eva Haskins PA-C

## 2024-06-08 DIAGNOSIS — F41.9 ANXIETY: ICD-10-CM

## 2024-06-08 RX ORDER — ESCITALOPRAM OXALATE 10 MG/1
TABLET ORAL
Qty: 135 TABLET | Refills: 0 | Status: SHIPPED | OUTPATIENT
Start: 2024-06-08

## 2024-06-18 ENCOUNTER — EVALUATION (OUTPATIENT)
Dept: PHYSICAL THERAPY | Facility: HOSPITAL | Age: 29
End: 2024-06-18
Payer: COMMERCIAL

## 2024-06-18 DIAGNOSIS — K66.0 POSTOPERATIVE ADHESIONS: ICD-10-CM

## 2024-06-18 DIAGNOSIS — M25.551 RIGHT HIP PAIN: Primary | ICD-10-CM

## 2024-06-18 PROCEDURE — 97162 PT EVAL MOD COMPLEX 30 MIN: CPT | Mod: GP | Performed by: PHYSICAL THERAPIST

## 2024-06-18 PROCEDURE — 97535 SELF CARE MNGMENT TRAINING: CPT | Mod: GP | Performed by: PHYSICAL THERAPIST

## 2024-06-18 ASSESSMENT — ENCOUNTER SYMPTOMS
LOSS OF SENSATION IN FEET: 0
DEPRESSION: 0
OCCASIONAL FEELINGS OF UNSTEADINESS: 0

## 2024-06-18 NOTE — PROGRESS NOTES
Physical Therapy Evaluation and Treatment      Patient Name: Zara Corey  MRN: [id number]  Today's Date: 6/18/2024  Time Calculation  Start Time: 0815  Stop Time: 0900  Time Calculation (min): 45 min      Assessment:   29 yo female with LEANNA and hx of R combined hip arthroscopy and periacetabular osteotomy Jan 2023, has had post op PT x2 with some improvement but did not further improve, overall continues to experience significant pain, heaviness and stiffness along R hip region ant and post, HS region, further imaging with possible adhesions, recent cortisone injection with no relief, pt is referred to PT for trial, at this time she displays good ROM, pain with end range flex overpressure, good strength, suspect due to myofascial pain along with residual LEANNA/labral adhesions, PT will work on STM and myofascial pain aspect, recommend pt to gradually resume light swimming, details reviewed, pt verbalized understanding.    Plan:  Check LEFS next session,  1x/week 10 weeks, consider dry needling TFL, glut, piriformis, other options of dry needling for hip flexors and HS (done with other providers if possible), STM with massage gun, review stretching and strengthening program.    Add low impact and impact activities if able.    PT Plan: Skilled PT  PT Frequency: 1 time per week  Duration: 10 weeks  Onset Date: 05/01/24  Number of Treatments Authorized: MMO 40V  Rehab Potential: Fair  Plan of Care Agreement: Patient    Current Problem:   1. Right hip pain  Follow Up In Physical Therapy      2. Postoperative adhesions  Referral to Physical Therapy    Follow Up In Physical Therapy          Subjective    General:  General  Reason for Referral: postoperative adhesions  Referred By: Hayder Dunbar  Past Medical History Relevant to Rehab: POTS, recent hx of REJI R hip  Precautions:  Precautions  STEADI Fall Risk Score (The score of 4 or more indicates an increased risk of falling): 0    HPI: hx of R combined hip  arthroscopy and periacetabular osteotomy Jan 2023, post op doing better, discontinued with PT since late 2023, but continues with pain along R hip ant incision, lateral hip and HS area with tendinosis, new imaging with findings of adhesions, still feels sensitivity, heaviness and ache along R hip, especially after prolonged time on her feet, with some limping afterwards, recently received a cortisone injection in May with no significant relief, pt was also referred to PT for possible adhesion relief.     Pt states did not feel much improvement after surgery, might need further surgical interventions.   Pt is doing seated figure 4 stretch, butterfly stretch, yoga which is helping with stiffness sometimes, tried some running but not able to tolerate, has not tried swimming yet.     IMAGING: x-ray healed REJI.     PMH: chronic hip, POTS under control, no fainting episodes.      MEDICATIONS: advil for pain intermittently.      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      BASELINE FUNCTION:  stretching and walking currently due to hip pain, prior to surgery was running 3-4 miles per day, swimming and weight lifting (has not resumed since surgery)    PAIN: current 3-4/10 Worst 6/10      Best 3/10  description and location of pain: tightness, heaviness and stiffness along R ant hip, R HS area and some along R lateral hip, some sensitivity along R ant thigh.   Pain aggravated by: prolonged standing, sleeping on R side OK with sitting.   Pain relieved by: stretching, advil.    Vital Signs:     Pain:     Home Living:     Prior Level of Function:       Objective   OBSERVATION: standing even pelvis, weight shifts well.     PALPATION: TTP along R hip flexors, deep ant joint R hip, some along R TFL, gluts and mid HS.      SENSATION: intact to light touch.     ROM: (R/L) *Pain  Hip flex 115deg with pain along R ant hip with end range overpressure.   Hip abd 35deg no pain  Hip IR 30deg  Hip  ER 30deg  Hip ext 10deg      STRENGTH: (R/L) 4/5 with hip IR, ER, abd, ext, some discomfort along R ant hip  Weakness HS with pain along mid HS belly.           FUNCTIONAL STRENGTH:  SFMA: all functional, pain with deep squat and single leg 1/3 squat along R ant hip.   Balance WNL  Gait WNL      Outcome Measures:        Treatments:  Treatment provided today:   Educated patient on evaluation findings and POC, expectations and course of PT, questions addressed.   Self management edu: reviewed with pt use of massage gun for STM at home, practiced in clinic, reviewed with pt self stretching, can continue. Recommend gradual return to swimming (light kick or aqua jogging at this time), expectations reviewed with pt.     Activity:      OP EDUCATION:       Goals:  To be met at time of discharge:  -- Decrease pain to  <2/10__.  -- Independent with HEP.  -- ROM: no increased pain end range hip flex R with overpressure.  -- strength:  able to tolerate HS MMT 4/5 with no increased pain.   -- Functional outcome: able to tolerate sleeping on R side with pian <1/10.  Able to tolerate standing at work with pain <1/10 and no limp after.  Can resume swimming with no increased pain R hip.  LEFS >60/80.

## 2024-07-15 ENCOUNTER — TREATMENT (OUTPATIENT)
Dept: PHYSICAL THERAPY | Facility: HOSPITAL | Age: 29
End: 2024-07-15
Payer: COMMERCIAL

## 2024-07-15 DIAGNOSIS — M25.551 RIGHT HIP PAIN: Primary | ICD-10-CM

## 2024-07-15 DIAGNOSIS — K66.0 POSTOPERATIVE ADHESIONS: ICD-10-CM

## 2024-07-15 PROCEDURE — 97140 MANUAL THERAPY 1/> REGIONS: CPT | Mod: GP | Performed by: PHYSICAL THERAPIST

## 2024-07-15 NOTE — PROGRESS NOTES
Physical Therapy Treatment    Patient Name: Zara Corey  MRN: [id number]  Today's Date: 7/15/2024    Time Calculation  Start Time: 0327  Stop Time: 0408  Time Calculation (min): 41 min     PT Therapeutic Procedures Time Entry  Manual Therapy Time Entry: 41         Assessment:   Overall improving with tolerance on cycling and jogging activities, still has pain at end range hip flex, worked on STM and distraction, tolerated well with decreased pain after, will continue with self STM with massage gun at home, continue with her low impact and impact activities as able.      Plan:  Continue with above treatment, consider dry needling as needed, distraction, and continue with strengthening, low impact and impact activities.   Check LEFS.    OP PT Plan  PT Plan: Skilled PT (visit 2)  PT Frequency: 1 time per week  Duration: 10 weeks  Onset Date: 05/01/24  Number of Treatments Authorized: MMO 40V  Rehab Potential: Fair  Plan of Care Agreement: Patient    Current Problem  1. Right hip pain  Follow Up In Physical Therapy      2. Postoperative adhesions  Follow Up In Physical Therapy          Subjective   Pt states she has started working out in the [employer] [facility], tried walking, biking up to 30min, and jogging, up to 20min [id number] and felt OK after.    Using massage gun at home regularly, doing yoga for stretching.     General  Reason for Referral: postoperative adhesions  Referred By: Hayder Dunbar  Past Medical History Relevant to Rehab: POTS, recent hx of REJI R hip  Precautions  Precautions  STEADI Fall Risk Score (The score of 4 or more indicates an increased risk of falling): 0  Vital Signs     Pain       Objective   TTP along R hip flexors and ING, pain with end range hip flex R side, OK with gentle SAMIR and FADIR.      Outcome Measures:       Treatments:   Manual therapy:  STM with massage gun to R hip flexors and ING in supine in hip SAMIR position, then R glut and piriformis  in sidelying position  R hip  distraction with gait belt in R hip flex, FADIR position end range stretching, tolerated well  Reviewed withpt self massage gun to hip flexors, continue with her current jogging and cycling.    Activity:       OP EDUCATION:       Goals:   To be met at time of discharge:  -- Decrease pain to  <2/10__.  -- Independent with HEP.  -- ROM: no increased pain end range hip flex R with overpressure.  -- strength:  able to tolerate HS MMT 4/5 with no increased pain.   -- Functional outcome: able to tolerate sleeping on R side with pian <1/10.  Able to tolerate standing at work with pain <1/10 and no limp after.  Can resume swimming with no increased pain R hip.  LEFS >60/80.

## 2024-07-22 ENCOUNTER — TREATMENT (OUTPATIENT)
Dept: PHYSICAL THERAPY | Facility: HOSPITAL | Age: 29
End: 2024-07-22
Payer: COMMERCIAL

## 2024-07-22 DIAGNOSIS — M25.551 RIGHT HIP PAIN: Primary | ICD-10-CM

## 2024-07-22 DIAGNOSIS — K66.0 POSTOPERATIVE ADHESIONS: ICD-10-CM

## 2024-07-22 PROCEDURE — 97140 MANUAL THERAPY 1/> REGIONS: CPT | Mod: GP | Performed by: PHYSICAL THERAPIST

## 2024-07-22 NOTE — PROGRESS NOTES
Physical Therapy Treatment    Patient Name: Zara Corey  MRN: [id number]  Today's Date: 7/22/2024    Time Calculation  Start Time: 0415  Stop Time: 0500  Time Calculation (min): 45 min     PT Therapeutic Procedures Time Entry  Manual Therapy Time Entry: 40  Therapeutic Exercise Time Entry: 5         Assessment:   Overall improving with tolerance on cycling and jogging activities, still has pain at end range hip flex, repeated STM and distraction, reviewed to continue with her stretching (hip flex end range and hip ext end range) as able, continue with her low impact and impact activities as able.  Pt states goal of doing 1x pool exercises.     Plan:  See assessment.     OP PT Plan  PT Plan: Skilled PT (visit 3)  PT Frequency: 1 time per week  Duration: 10 weeks  Onset Date: 05/01/24  Number of Treatments Authorized: MMO 40V  Rehab Potential: Fair  Plan of Care Agreement: Patient    Current Problem  1. Right hip pain  Follow Up In Physical Therapy      2. Postoperative adhesions  Follow Up In Physical Therapy          Subjective   Pt states she was sore after last session along R hip flexor toward ING, as tightness and tugging feeling, has not jogged since but was able to walking, yoga this past week.    Pain rated min today, but feels like stiffness.      General  Reason for Referral: postoperative adhesions  Referred By: Hayder Dunbar  Past Medical History Relevant to Rehab: POTS, recent hx of REJI R hip  Precautions  Precautions  STEADI Fall Risk Score (The score of 4 or more indicates an increased risk of falling): 0  Vital Signs     Pain       Objective   TTP along R hip flexors and ING, pain with end range hip flex R side, OK with gentle SAMIR and FADIR.    Standing double leg deep squat with slight pain R ant hip, single leg 1/3 squat with no pain.   Hip flex 115-120deg with slight pain    Outcome Measures:       Treatments:   Manual therapy:  R hip distraction with gait belt in R hip flex, FADIR  position end range stretching, tolerated well  STM with massage gun to R hip flexors and ING in supine in hip SAMIR position, then R glut and piriformis  in sidelying position    Reviewed withpt self massage gun to hip flexors, continue with her current jogging and cycling.      Therapeutic exercise:   Reviewed self stretching into hip flex R end range, also add pigeon pose for R hip ext as able, pt verbalized understanding.    Activity:       OP EDUCATION:       Goals:   To be met at time of discharge:  -- Decrease pain to  <2/10__.  -- Independent with HEP.  -- ROM: no increased pain end range hip flex R with overpressure.  -- strength:  able to tolerate HS MMT 4/5 with no increased pain.   -- Functional outcome: able to tolerate sleeping on R side with pian <1/10.  Able to tolerate standing at work with pain <1/10 and no limp after.  Can resume swimming with no increased pain R hip.  LEFS >60/80.

## 2024-07-29 ENCOUNTER — TREATMENT (OUTPATIENT)
Dept: PHYSICAL THERAPY | Facility: HOSPITAL | Age: 29
End: 2024-07-29
Payer: COMMERCIAL

## 2024-07-29 DIAGNOSIS — M25.551 RIGHT HIP PAIN: Primary | ICD-10-CM

## 2024-07-29 DIAGNOSIS — K66.0 POSTOPERATIVE ADHESIONS: ICD-10-CM

## 2024-07-29 PROCEDURE — 97110 THERAPEUTIC EXERCISES: CPT | Mod: GP | Performed by: PHYSICAL THERAPIST

## 2024-07-29 PROCEDURE — 97140 MANUAL THERAPY 1/> REGIONS: CPT | Mod: GP | Performed by: PHYSICAL THERAPIST

## 2024-07-29 NOTE — PROGRESS NOTES
Physical Therapy Treatment    Patient Name: Zara Corey  MRN: [id number]  Today's Date: 7/29/2024    Time Calculation  Start Time: 0415  Stop Time: 0500  Time Calculation (min): 45 min     PT Therapeutic Procedures Time Entry  Manual Therapy Time Entry: 35  Therapeutic Exercise Time Entry: 10         Assessment:   Overall improving with tolerance on cycling and jogging activities, still has pain at end range hip flex, pt will be continuing with gentle stretching and with light low impact and impact activities as able.       Pt states goal of doing 1x pool exercises.     Plan:  Recheck in 3 weeks, check goals.     OP PT Plan  PT Plan: Skilled PT (visit 4)  PT Frequency: 1 time per week  Duration: 10 weeks  Onset Date: 05/01/24  Number of Treatments Authorized: MMO 40V  Rehab Potential: Fair  Plan of Care Agreement: Patient    Current Problem  1. Right hip pain  Follow Up In Physical Therapy      2. Postoperative adhesions  Follow Up In Physical Therapy          Subjective   Pt states she felt pretty good after last session, but since last week she has had a family emergency thus not able to do any stretching or exercises, feels tight and some pain R hip.     General  Reason for Referral: postoperative adhesions  Referred By: Hayder Dunbar  Past Medical History Relevant to Rehab: POTS, recent hx of REJI R hip  Precautions  Precautions  STEADI Fall Risk Score (The score of 4 or more indicates an increased risk of falling): 0  Vital Signs     Pain       Objective   Slight TTP along R hip flexors and ING, pain with end range hip flex R side, OK with gentle SAMIR and FADIR.    Hip flex 115-120deg with slight pain    Outcome Measures:       Treatments:   Manual therapy:  R hip gentle distraction with leg pull, then end range stretching flex, IR, ER and ext in supine then prone  STM with massage gun to R hip flexors and ING in supine in hip SAMIR position, then R glut and piriformis  in sidelying position    Pt is to  continue with self massage gun to hip flexors, continue with her current jogging and cycling.      Therapeutic exercise:  Ellipitcal L4 4min/4min  Reviewed self stretching,  no excessive stretching, resume her jogging or cycling as able.     Activity:       OP EDUCATION:       Goals:   To be met at time of discharge:  -- Decrease pain to  <2/10__.  -- Independent with HEP.  -- ROM: no increased pain end range hip flex R with overpressure.  -- strength:  able to tolerate HS MMT 4/5 with no increased pain.   -- Functional outcome: able to tolerate sleeping on R side with pian <1/10.  Able to tolerate standing at work with pain <1/10 and no limp after.  Can resume swimming with no increased pain R hip.  LEFS >60/80.

## 2024-08-20 ENCOUNTER — APPOINTMENT (OUTPATIENT)
Dept: PHYSICAL THERAPY | Facility: HOSPITAL | Age: 29
End: 2024-08-20
Payer: COMMERCIAL

## 2024-08-28 ENCOUNTER — TREATMENT (OUTPATIENT)
Dept: PHYSICAL THERAPY | Facility: HOSPITAL | Age: 29
End: 2024-08-28
Payer: COMMERCIAL

## 2024-08-28 DIAGNOSIS — K66.0 POSTOPERATIVE ADHESIONS: ICD-10-CM

## 2024-08-28 DIAGNOSIS — M25.551 RIGHT HIP PAIN: Primary | ICD-10-CM

## 2024-08-28 PROCEDURE — 97140 MANUAL THERAPY 1/> REGIONS: CPT | Mod: GP | Performed by: PHYSICAL THERAPIST

## 2024-08-28 PROCEDURE — 97110 THERAPEUTIC EXERCISES: CPT | Mod: GP | Performed by: PHYSICAL THERAPIST

## 2024-08-28 NOTE — PROGRESS NOTES
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         Plan:  Recheck in 1 month. Repeat STM and distraction,  check goals.     OP PT Plan  PT Plan: Skilled PT (visit 5)  PT Frequency: 1 time per week  Duration: 10 weeks  Onset Date: 05/01/24  Number of Treatments Authorized: MMO 40V  Rehab Potential: Fair  Plan of Care Agreement: Patient    Current Problem  1. Right hip pain  Follow Up In Physical Therapy      2. Postoperative adhesions  Follow Up In Physical Therapy          Subjective   Pt states over the past 2 weeks she has been busy with work and not able to exercise much, together with work load and stress, she feels more pain along R hip at buttocks, inner thigh and ant hip, no pain this morning.    Recently not much exercise just walking at work, home remodeling.  Not sleeping as well.      General  Reason for Referral: postoperative adhesions  Referred By: Hayder Dunbar  Past Medical History Relevant to Rehab: POTS, recent hx of REJI R hip  Precautions     Vital Signs     Pain       Objective   Slight TTP along R hip flexors and ING, proximal medial HS, pain with end range hip flex R side, OK with gentle SAMIR and FADIR.        Outcome Measures:       Treatments:   Manual therapy:  R hip gait belt assisted distraction then end range stretching flex, IR, ER  in supine.   STM  with massage gun to R hip flexors and ING in supine in hip SAMIR position, then R glut and piriformis, proximal HS in sidelying position    Pt is to continue with self massage gun to hip flexors, continue with her current jogging and cycling.      Therapeutic exercise:  Ellipitcal L3 4min/4min  Reviewed self stretching,  no excessive stretching, resume her jogging or cycling as able.     Activity:       OP EDUCATION:       Goals:   To be met at time of discharge:  -- Decrease pain to  <2/10__.  -- Independent with HEP.  -- ROM: no increased pain end range hip flex R with overpressure.  -- strength:  able to tolerate HS MMT 4/5 with no increased pain.   -- Functional outcome: able to tolerate sleeping on R side with pian <1/10.  Able to tolerate standing at work with pain <1/10 and no limp after.  Can resume swimming with no increased pain R hip.  LEFS >60/80.

## 2024-09-04 DIAGNOSIS — F41.9 ANXIETY: ICD-10-CM

## 2024-09-04 RX ORDER — ESCITALOPRAM OXALATE 10 MG/1
10 TABLET ORAL DAILY
Qty: 135 TABLET | Refills: 0 | Status: CANCELLED | OUTPATIENT
Start: 2024-09-04

## 2024-09-09 DIAGNOSIS — F41.9 ANXIETY: ICD-10-CM

## 2024-09-09 RX ORDER — ESCITALOPRAM OXALATE 10 MG/1
10 TABLET ORAL DAILY
Qty: 135 TABLET | Refills: 0 | Status: SHIPPED | OUTPATIENT
Start: 2024-09-09

## 2024-09-26 ENCOUNTER — TREATMENT (OUTPATIENT)
Dept: PHYSICAL THERAPY | Facility: HOSPITAL | Age: 29
End: 2024-09-26
Payer: COMMERCIAL

## 2024-09-26 DIAGNOSIS — K66.0 POSTOPERATIVE ADHESIONS: ICD-10-CM

## 2024-09-26 DIAGNOSIS — M25.551 RIGHT HIP PAIN: ICD-10-CM

## 2024-09-26 PROCEDURE — 97110 THERAPEUTIC EXERCISES: CPT | Mod: GP | Performed by: PHYSICAL THERAPIST

## 2024-09-26 PROCEDURE — 97140 MANUAL THERAPY 1/> REGIONS: CPT | Mod: GP | Performed by: PHYSICAL THERAPIST

## 2024-09-26 NOTE — PROGRESS NOTES
Physical Therapy Treatment    Patient Name: Zara Corey  MRN: [id number]  Today's Date: 9/26/2024    Time Calculation  Start Time: 0345  Stop Time: 0430  Time Calculation (min): 45 min     PT Therapeutic Procedures Time Entry  Manual Therapy Time Entry: 20  Therapeutic Exercise Time Entry: 25         Assessment:  Overall improving with less pain throughout daily activities, able to resume walking and standing exercises with less pain, still has pain along R HS, hip flexors, and toward groin area, will continue with self STM with massage gun, PT with distraction and STM, recommend pt to also seek pelvic floor PT and possible hip flex/adductor/ HS dry needling, details reviewed, pt verbalized understanding.         Plan:  Repeat STM and distraction,  check LEFS, 1 more session.     OP PT Plan  PT Plan: Skilled PT (visit 6)  PT Frequency: 1 time per week  Duration: 10 weeks  Onset Date: 05/01/24  Number of Treatments Authorized: MMO 40V  Rehab Potential: Fair  Plan of Care Agreement: Patient    Current Problem  1. Postoperative adhesions  Follow Up In Physical Therapy      2. Right hip pain  Follow Up In Physical Therapy          Subjective   Pt states stress level has decreased, able to walk a lot, working on her house renovation, overall less pain during the day, but feels more heaviness in the morning upon getting up.    Min pain today along R lateral hip and hip adductors, using massage gun at night daily. Pain rated 1/10.     Sleeping has been OK.      General  Reason for Referral: postoperative adhesions  Referred By: Hayder Dunbar  Past Medical History Relevant to Rehab: POTS, recent hx of REJI R hip  Precautions     Vital Signs     Pain       Objective   Re-check:  Even pelvis  Standing FWB with discomfort R medial HS    Functional strength:  Single leg heel raises R x10 WNL  Single leg 1/3 squat x5, discomfort R medial HS and weakness feeling  Light HHA, forward T x10 with slight discomfort R side  HS.    Palpation TTP along R hip flexors, ING, medial HS,     Hip ROM flex 115deg with onset of discomfort   Neg SAMIR and MILADYIR.           Outcome Measures:       Treatments:   Manual therapy:  STM with massage gun to R medial HS toward groin region, external OI.     Discussed with pt further POC,  continue with self STM, seek pelvic floor PT treatment.        Therapeutic exercise:  Ellipitcal L4 3min/3 min  recheck  Reviewed self stretching,  no excessive stretching, resume her jogging or cycling as able.     Activity:       OP EDUCATION:       Goals:   To be met at time of discharge:  -- Decrease pain to  <2/10__.  -- Independent with HEP.  -- ROM: no increased pain end range hip flex R with overpressure.  -- strength:  able to tolerate HS MMT 4/5 with no increased pain.   -- Functional outcome: able to tolerate sleeping on R side with pian <1/10.  Able to tolerate standing at work with pain <1/10 and no limp after.  Can resume swimming with no increased pain R hip.  LEFS >60/80.

## 2024-10-01 ENCOUNTER — TREATMENT (OUTPATIENT)
Dept: PHYSICAL THERAPY | Facility: HOSPITAL | Age: 29
End: 2024-10-01
Payer: COMMERCIAL

## 2024-10-01 DIAGNOSIS — K66.0 POSTOPERATIVE ADHESIONS: ICD-10-CM

## 2024-10-01 DIAGNOSIS — M25.551 RIGHT HIP PAIN: Primary | ICD-10-CM

## 2024-10-01 PROCEDURE — 97140 MANUAL THERAPY 1/> REGIONS: CPT | Mod: GP | Performed by: PHYSICAL THERAPIST

## 2024-10-01 NOTE — PROGRESS NOTES
Physical Therapy Treatment    Patient Name: Zara Corey  MRN: [id number]  Today's Date: 10/1/2024    Time Calculation  Start Time: 0820  Stop Time: 0907  Time Calculation (min): 47 min     PT Therapeutic Procedures Time Entry  Manual Therapy Time Entry: 41  Therapeutic Exercise Time Entry: 6         Assessment:  Overall improving with less pain throughout daily activities, able to resume walking and standing exercises with less pain, still has some pain with prolonged standing activities,  till has pain along R HS, hip flexors, and toward groin area, will continue with self STM with massage gun,  max benefit achieved with this PT, recommend pt to also seek pelvic floor PT and possible hip flex/adductor/ HS dry needling, details reviewed, pt verbalized understanding.         Plan:  Follow up prn, if no follow up needed, consider this as discharge.     OP PT Plan  PT Plan: Skilled PT (visit 7)  PT Frequency: 1 time per week  Duration: 10 weeks  Onset Date: 05/01/24  Number of Treatments Authorized: MMO 40V  Rehab Potential: Fair  Plan of Care Agreement: Patient    Current Problem  1. Right hip pain  Follow Up In Physical Therapy      2. Postoperative adhesions  Follow Up In Physical Therapy          Subjective   Pt states a lot of standing yesterday and felt heaviness R lateral hip and inner thigh, pain at 3/10 today.    She is using massage gun at night daily.     Sleeping has been OK.      General  Reason for Referral: postoperative adhesions  Referred By: Hayder Dunbar  Past Medical History Relevant to Rehab: POTS, recent hx of REJI R hip  Precautions     Vital Signs     Pain       Objective   10/1/24: no new chagnes     Re-check:  Even pelvis  Standing FWB with discomfort R medial HS    Functional strength:  Single leg heel raises R x10 WNL  Single leg 1/3 squat x5, discomfort R medial HS and weakness feeling  Light HHA, forward T x10 with slight discomfort R side HS.    Palpation TTP along R hip flexors,  ING, medial HS,     Hip ROM flex 115deg with onset of discomfort   Neg SAMIR and MILADYIR.           Outcome Measures:       Treatments:   Manual therapy:  Gait belt assisted R hip distraction supine  STM with massage gun to R medial HS toward groin region,  hip adductor  Prone STM to R post thigh, pelvis area  Hip ROM PROM stretching after.    Discussed with pt further POC,  continue with self STM, seek pelvic floor PT treatment.        Therapeutic exercise:  Ellipitcal L3 3min/3 min  Reviewed self stretching,  no excessive stretching, resume her jogging or cycling as able.     Activity:       OP EDUCATION:       Goals:   To be met at time of discharge:  -- Decrease pain to  <2/10__.  -- Independent with HEP.  -- ROM: no increased pain end range hip flex R with overpressure.  -- strength:  able to tolerate HS MMT 4/5 with no increased pain.   -- Functional outcome: able to tolerate sleeping on R side with pian <1/10.  Able to tolerate standing at work with pain <1/10 and no limp after.  Can resume swimming with no increased pain R hip.  LEFS >60/80.

## 2024-10-07 DIAGNOSIS — R10.2 FEMALE PELVIC PAIN: ICD-10-CM

## 2024-11-04 ENCOUNTER — APPOINTMENT (OUTPATIENT)
Dept: OBSTETRICS AND GYNECOLOGY | Facility: HOSPITAL | Age: 29
End: 2024-11-04
Payer: COMMERCIAL

## 2024-11-06 ENCOUNTER — APPOINTMENT (OUTPATIENT)
Dept: ORTHOPEDIC SURGERY | Facility: CLINIC | Age: 29
End: 2024-11-06
Payer: COMMERCIAL

## 2024-11-10 DIAGNOSIS — F41.9 ANXIETY: ICD-10-CM

## 2024-11-11 RX ORDER — ESCITALOPRAM OXALATE 10 MG/1
10 TABLET ORAL DAILY
Qty: 135 TABLET | Refills: 0 | OUTPATIENT
Start: 2024-11-11

## 2024-11-12 ENCOUNTER — EVALUATION (OUTPATIENT)
Dept: PHYSICAL THERAPY | Facility: CLINIC | Age: 29
End: 2024-11-12
Payer: COMMERCIAL

## 2024-11-12 DIAGNOSIS — M62.81 MUSCLE WEAKNESS: ICD-10-CM

## 2024-11-12 DIAGNOSIS — M62.89 MUSCLE TIGHTNESS: ICD-10-CM

## 2024-11-12 DIAGNOSIS — R10.2 FEMALE PELVIC PAIN: Primary | ICD-10-CM

## 2024-11-12 DIAGNOSIS — Z87.768: ICD-10-CM

## 2024-11-12 PROCEDURE — 97535 SELF CARE MNGMENT TRAINING: CPT | Mod: GP | Performed by: PHYSICAL THERAPIST

## 2024-11-12 PROCEDURE — 97163 PT EVAL HIGH COMPLEX 45 MIN: CPT | Mod: GP | Performed by: PHYSICAL THERAPIST

## 2024-11-12 NOTE — PROGRESS NOTES
Physical Therapy  Physical Therapy Pelvic Floor Evaluation    Name: Zara Corey  MRN: [id number]  : 1995  Encounter Date: 2024  Visit Count: 1     Time Calculation  Start Time: 830  Stop Time: 935  Time Calculation (min): 65 min    PT Therapeutic Procedures Time Entry  Self-Care/Home Mgmt Trainin    Insurance: O  Visit # 1 of 33 for  (7 used previously ortho PT)    Current Problem:  1. Female pelvic pain  Referral to Physical Therapy    Follow Up In Physical Therapy      2. Personal history of developmental dysplasia of the hip  Follow Up In Physical Therapy      3. Muscle tightness  Follow Up In Physical Therapy      4. Muscle weakness  Follow Up In Physical Therapy          General  Reason for Referral: pelvic pain  Referred By: Valerie Kenney Fall Risk Score (The score of 4 or more indicates an increased risk of falling): 0  Precautions Comment: hip dysplasia & multiple surgeries  Vital Signs     Pain       Subjective  Chief Complaint: pain with intercourse, chronic UTI's (symptoms: fullness, burning) - sometimes test coming back negative  24: R hip Asp/Inj  24: R - Pelvic Hardware Removal  2023: R hip arthroscopy & REJI  Onset:   GEE: unknown    : pain with intercourse and frequent UTIs & R hip pain around this same time  - family hx of hip dysplasia  - continued R hip and pelvic pain (Injection & PT-main)   *Massage Gun, Manual Therapy - temporarily helpful   *Previously completed strengthening PT - mostly body weight - flared with increased pain  - history: pain in the rectum, began in  - same as hip and pain with intercourse, episode of fecal incontinence with intercourse (on top, may have been in climax) - hx of anal fissures  - currently: no episodes of FI since surgery, but in rectum during menstrual cycle - no anal fissure association during cycle    Current Condition: was better with manual therapy - worsening without regular  manual therapy (last apt 6 weeks ago)    PAIN  Intensity (0-10): 3  Location: R hip  Description: aching - ASIS, pinching - side and under  Meralgia Paresthetica: originally hypersensitive, improved to now more dullness in sensation    Aggravating Factors: sitting, walking  Relieving Factors: peeing, heating pad, massage gun    Relevant Information (PMH & Previous Tests/Imaging): see chart  Previous Interventions/Treatments: PT    Prior Level of Function (PLOF)  Exercise/Physical Activity: strength training: weight lifting, running, swimming (30#)  Work/School: teaching kitchen coordinator at [employer] - a lot of walking and standing    Treatment Goals: less pain in hip/pelvis    Cultural or Spiritual Practices: no  History of Trauma: no  Safe at Home: yes     OB/GYN HISTORY:   Pregnancies (): 0    Painful [place] or vaginal penetration?  around the same time as hip pain - still present now, less intense, pain with deep penetration, bleeding following sex, (utilizing lubricant: Uberlube - not associated with dryness) - able to achieve orgasm  Frequency? Deep penetration  Positional? Supine, legs up & hands/knees - deeper penetration  Marinoff Scale? 2-3   0 - no pain during intercourse    1 - pain during intercourse that does not prevent intercourse   X 2 - pain during intercourse that interrupts intercourse   X 3 - pain that prevents intercourse - sometimes     BLADDER  Frequency of Urination  Daytime: morning: every 30min, daytime classes: holding 2 hours  Nighttime: 0  Screening = Recurrent infections/UTIs? Yes - symptoms when you urinate  Other Symptoms   Trouble initiating urine stream   X Urine stream is intermittent or slow - stream spraying    Trouble emptying bladder completely   X Dribbling after urination   X Straining or pushing to empty    Trouble feeling bladder urge/fullness   X Trouble holding urine when you get an urge - occasional leak   Urinary Incontinence/Leakage  Present with cough  and/or sneeze? No  Present with physical activity and/or exertion? No  Do you wear any barriers, such as pantishields or pads? No  Average Fluid Intake (glasses/day): coffee - 4c, water - 25fl x2, sparkling water     BOWEL  Frequency of Bowel Movements: everyday (pretty severe constipation as a child)  Stool Form: Rincon Scale = loose at times after coffee  Management Techniques: smoothie: acaci sengal fiber & kefir   Average Fiber Intake (per day): good      Objective  Patient was educated on pelvic floor anatomy, function, and dysfunction.   Patient was educated on an orthopedic assessment & external pelvic floor assessment technique and verbalized consent.   The patient is aware they have full autonomy and can stop the assessment at any time.     Standing Assessment:   Posture:   SL Stance: hip drop on R  DL Squat: PPT at depth, weight shift off of R leg  Gait: ligament dominant  Standing Lumbar Mobility: hypermobility  Standing Pelvic Alignment: R iliac crest elevated    Supine Movement Assessment   SLR: global co-activation of core  Pelvic Positioning at rest: PPT  Hip PROM: hypermobility bilaterally  MMT: sup hip: 4-/5 bilaterally    Supine Mobility Assessment  - Hamstrings: end range tightness  - Piriformis: end range tightness    Diaphragmatic Breathing: fair  Rib Palpation/Positioning: down-rung  Assess Abdomen  Rectus Abdominus: global tightness  Obliques: global tightness  Iliopsoas: global tightness    OUTCOMES MEASURE:  Jackelyn Pelvic Dysfunction Screening Tool : positive    NIH-CPSI  -Pain: 13  - Urinary Symp: 6  - QOL: 6    Goals:   Active       PT Problem       PT Goal 1       Start:  11/12/24    Expected End:  02/10/25       Patient will return to prior level of function with minimal to no pain and without limitations for participation in ADLs, health, and exercise.   Patient demonstrate home exercise program adherence to supplement symptom reduction and functional gains made in clinic  Patient will  reports minimal to no pain for participation in ADLs and return to PLOF.   Patient will demonstrate coordination of pelvic floor, strength & relaxation wnl for return to ADLs and PLOF without restriction.   Pt will demonstrate improvement on the outcome measure score to demonstrate overall improved functional abilities and quality of life.              Education: home exercise program, plan of care, activity modifications, pain management, and injury pathology  Bladder Habits: Just in Case Pee, Hover over the toilet, relax & breathe, squatty potty use  Hydration Recommendation: 50% of your body wt (pounds) in fluid ounces  Bladder Irritants: caffeine, artificial sweeteners, carbonated beverages, alcohol  Fiber Intake Recommendation: 25-30g/day    Treatments:  - education: home exercise program, plan of care, activity modifications, pain management, and injury pathology  - bladder irritants discussed (give paper next visit) - lessen coffee and sparking corcoran    Plan for Next Visit:   - abdominal-pelvic manual release  - PF relaxation and stretching  - open to internal assessment in future    Assessment: Patient presents with signs and symptoms consistent with dyspareunia, PF dysfunction, muscle tightness, muscle weakness, hx of hip dysplasia and resullting R hip arthroscopy and REJI Jan 2023 with hardware removal Feb 2024, resulting in limited participation in pain-free ADLs and inability to perform at their prior level of function. Pt would benefit from physical therapy to address the impairments found & listed previously in the objective section in order to return to safe and pain-free ADLs and prior level of function.    Plan:   Planned Interventions include: therapeutic exercise, self-care home management, manual therapy, therapeutic activities, gait training, neuromuscular coordination, aquatic therapy  Patient and/or family understands and agrees with goals and plan documented: yes  Frequency:  2x/month  Duration: 6 months     Angelica aMrk, PT

## 2024-11-13 ENCOUNTER — OFFICE VISIT (OUTPATIENT)
Dept: PRIMARY CARE | Facility: CLINIC | Age: 29
End: 2024-11-13
Payer: COMMERCIAL

## 2024-11-13 VITALS
BODY MASS INDEX: 28.82 KG/M2 | DIASTOLIC BLOOD PRESSURE: 66 MMHG | HEART RATE: 68 BPM | SYSTOLIC BLOOD PRESSURE: 119 MMHG | WEIGHT: 183.6 LBS | RESPIRATION RATE: 16 BRPM | OXYGEN SATURATION: 99 % | HEIGHT: 67 IN

## 2024-11-13 DIAGNOSIS — R42 DIZZINESSES: ICD-10-CM

## 2024-11-13 DIAGNOSIS — Z13.220 SCREENING FOR LIPID DISORDERS: ICD-10-CM

## 2024-11-13 DIAGNOSIS — Z13.21 ENCOUNTER FOR VITAMIN DEFICIENCY SCREENING: ICD-10-CM

## 2024-11-13 DIAGNOSIS — Z00.00 ENCOUNTER FOR ANNUAL PHYSICAL EXAM: Primary | ICD-10-CM

## 2024-11-13 DIAGNOSIS — R42 VERTIGO: ICD-10-CM

## 2024-11-13 DIAGNOSIS — Z13.1 SCREENING FOR DIABETES MELLITUS (DM): ICD-10-CM

## 2024-11-13 DIAGNOSIS — Z13.29 SCREENING FOR THYROID DISORDER: ICD-10-CM

## 2024-11-13 DIAGNOSIS — F41.9 ANXIETY: ICD-10-CM

## 2024-11-13 PROBLEM — G47.419 NARCOLEPSY: Status: RESOLVED | Noted: 2023-05-24 | Resolved: 2024-11-13

## 2024-11-13 PROBLEM — G47.411 CATAPLEXY (HHS-HCC): Status: RESOLVED | Noted: 2023-05-24 | Resolved: 2024-11-13

## 2024-11-13 PROCEDURE — 1036F TOBACCO NON-USER: CPT

## 2024-11-13 PROCEDURE — 3008F BODY MASS INDEX DOCD: CPT

## 2024-11-13 PROCEDURE — 99395 PREV VISIT EST AGE 18-39: CPT

## 2024-11-13 RX ORDER — ESCITALOPRAM OXALATE 10 MG/1
15 TABLET ORAL DAILY
Qty: 45 TABLET | Refills: 5 | Status: SHIPPED | OUTPATIENT
Start: 2024-11-13 | End: 2025-05-12

## 2024-11-13 RX ORDER — MECLIZINE HYDROCHLORIDE 25 MG/1
12.5 TABLET ORAL 2 TIMES DAILY PRN
Qty: 14 TABLET | Refills: 0 | Status: SHIPPED | OUTPATIENT
Start: 2024-11-13 | End: 2024-11-27

## 2024-11-13 ASSESSMENT — PATIENT HEALTH QUESTIONNAIRE - PHQ9
SUM OF ALL RESPONSES TO PHQ9 QUESTIONS 1 AND 2: 2
1. LITTLE INTEREST OR PLEASURE IN DOING THINGS: NOT AT ALL
10. IF YOU CHECKED OFF ANY PROBLEMS, HOW DIFFICULT HAVE THESE PROBLEMS MADE IT FOR YOU TO DO YOUR WORK, TAKE CARE OF THINGS AT HOME, OR GET ALONG WITH OTHER PEOPLE: SOMEWHAT DIFFICULT
SUM OF ALL RESPONSES TO PHQ9 QUESTIONS 1 AND 2: 0
2. FEELING DOWN, DEPRESSED OR HOPELESS: SEVERAL DAYS
2. FEELING DOWN, DEPRESSED OR HOPELESS: NOT AT ALL
1. LITTLE INTEREST OR PLEASURE IN DOING THINGS: SEVERAL DAYS

## 2024-11-13 ASSESSMENT — COLUMBIA-SUICIDE SEVERITY RATING SCALE - C-SSRS
2. HAVE YOU ACTUALLY HAD ANY THOUGHTS OF KILLING YOURSELF?: NO
1. IN THE PAST MONTH, HAVE YOU WISHED YOU WERE DEAD OR WISHED YOU COULD GO TO SLEEP AND NOT WAKE UP?: NO
6. HAVE YOU EVER DONE ANYTHING, STARTED TO DO ANYTHING, OR PREPARED TO DO ANYTHING TO END YOUR LIFE?: NO

## 2024-11-13 ASSESSMENT — PAIN SCALES - GENERAL: PAINLEVEL_OUTOF10: 3

## 2024-11-13 ASSESSMENT — ENCOUNTER SYMPTOMS
DEPRESSION: 0
OCCASIONAL FEELINGS OF UNSTEADINESS: 0
LOSS OF SENSATION IN FEET: 0

## 2024-11-13 NOTE — PATIENT INSTRUCTIONS
ANXIETY:   Stress/Anxiety reduction interventions:  -Relaxation techniques- deep breathing, meditation, acupuncture, guided imagery, yoga  -3-3-3 sit down in a quiet room. Look around the room and name 3 items you see. Then close your eyes and listen and quietly name 3 items you hear around you. Lastly, move 3 different body parts in a [place] 10 times each  -Avoid caffeine, alcohol, illicit drug use  -Get enough sleep, 7-9 hours per night  -Eat a healthy diet, prioritizing lean proteins, fruits/vegetables, and whole grains  -Purposeful movement daily- walking, biking, strength training  -Speaking with a counselor can be very helpful.  Dizziness-   Take meclizine as ordered as needed  CT head ordered for dizziness. Please call to have this done. This office will call you with the results once received.   LAB Order/ BLOOD TESTS   I have ordered lab work for you to get done. This should be fasting. Nothing to eat or drink after midnight besides black tea,  black coffee, or water. If you do not hear from this office within two days of having your labs done, please call for your results.

## 2024-11-13 NOTE — PROGRESS NOTES
"Subjective   Patient ID: Zara Corey is a 28 y.o. female who presents for Establish Care. Former Marisol Tian Pt     HPI   Patient denies any falls, urgent care, ER, hospitalization, new diagnoses, surgeries since they were here last.  Denies any issues with chest pain, chest pressure, shortness of breath, constipation, diarrhea, blood in stool, urinary urgency, frequency, blood in urine, muscle weakness in arms and legs, numbness or tingling in fingers or toes.  Tension headache intermittently once weekly.   Dizziness- since Sunday increasing since then. She has been out of lexapro since Sunday.   States she feels \"airheaded\" denies trauma to the head.   Admits to diarrhea that has been ongoing. Take pee supplement. Helps diarrhea.   Urinary urge freq with HX frequent UTI. Was seeing Urologist. Currently in pelvic floor therapy.     Review of Systems  Review of Systems negative except as noted in HPI and Chief complaint.    Current Outpatient Medications:     levonorgestrel (Mirena) 21 mcg/24 hours (8 yrs) 52 mg IUD, 52 mg by intrauterine route 1 time., Disp: , Rfl:     escitalopram (Lexapro) 10 mg tablet, Take 1.5 tablets (15 mg) by mouth once daily., Disp: 45 tablet, Rfl: 5    meclizine (Antivert) 25 mg tablet, Take 0.5 tablets (12.5 mg) by mouth 2 times a day as needed for dizziness for up to 14 days., Disp: 14 tablet, Rfl: 0    Objective   /66 (BP Location: Left arm, Patient Position: Sitting, BP Cuff Size: Adult)   Pulse 68   Resp 16   Ht 1.702 m (5' 7\")   Wt 83.3 kg (183 lb 9.6 oz)   SpO2 99%   BMI 28.76 kg/m²     Physical Exam  Vitals reviewed.   Constitutional:       General: She is not in acute distress.     Appearance: Normal appearance.   HENT:      Head: Normocephalic.      Right Ear: Tympanic membrane normal.      Left Ear: Tympanic membrane normal.      Nose: Nose normal.      Mouth/Throat:      Mouth: Mucous membranes are moist.      Pharynx: Oropharynx is clear.   Eyes:      " Extraocular Movements: Extraocular movements intact.      Conjunctiva/sclera: Conjunctivae normal.      Pupils: Pupils are equal, round, and reactive to light.   Cardiovascular:      Rate and Rhythm: Normal rate.      Pulses: Normal pulses.      Heart sounds: Normal heart sounds.   Pulmonary:      Effort: Pulmonary effort is normal.      Breath sounds: Normal breath sounds.   Abdominal:      General: Abdomen is flat. Bowel sounds are normal.      Palpations: Abdomen is soft.   Musculoskeletal:         General: Normal range of motion.   Skin:     General: Skin is warm and dry.      Capillary Refill: Capillary refill takes 2 to 3 seconds.   Neurological:      General: No focal deficit present.      Mental Status: She is alert and oriented to person, place, and time. Mental status is at baseline.   Psychiatric:         Mood and Affect: Mood normal.         Behavior: Behavior is cooperative.         Assessment/Plan   Diagnoses and all orders for this visit:  Encounter for annual physical exam  -     Comprehensive Metabolic Panel; Future  -     CBC and Auto Differential; Future  Screening for diabetes mellitus (DM)  -     Hemoglobin A1C; Future  Screening for thyroid disorder  -     TSH with reflex to Free T4 if abnormal; Future  Encounter for vitamin deficiency screening  -     Vitamin D 25-Hydroxy,Total (for eval of Vitamin D levels); Future  Screening for lipid disorders  -     Lipid Panel; Future  Dizzinesses  -     meclizine (Antivert) 25 mg tablet; Take 0.5 tablets (12.5 mg) by mouth 2 times a day as needed for dizziness for up to 14 days.  -     CT head w and wo IV contrast; Future  Anxiety  -     escitalopram (Lexapro) 10 mg tablet; Take 1.5 tablets (15 mg) by mouth once daily.  Vertigo  -     CT head w and wo IV contrast; Future  ANXIETY:   Stress/Anxiety reduction interventions:  -Relaxation techniques- deep breathing, meditation, acupuncture, guided imagery, yoga  -3-3-3 sit down in a quiet room. Look around  the room and name 3 items you see. Then close your eyes and listen and quietly name 3 items you hear around you. Lastly, move 3 different body parts in a Solomon 10 times each  -Avoid caffeine, alcohol, illicit drug use  -Get enough sleep, 7-9 hours per night  -Eat a healthy diet, prioritizing lean proteins, fruits/vegetables, and whole grains  -Purposeful movement daily- walking, biking, strength training  -Speaking with a counselor can be very helpful.  Dizziness-   Take meclizine as ordered as needed  CT head ordered for dizziness. Please call to have this done. This office will call you with the results once received.   LAB Order/ BLOOD TESTS   I have ordered lab work for you to get done. This should be fasting. Nothing to eat or drink after midnight besides black tea,  black coffee, or water. If you do not hear from this office within two days of having your labs done, please call for your results.   This note was dictated using DRAGON speech recognition software and was corrected for spelling or grammatical errors, but despite proofreading several typographical errors might be present that might affect the meaning of the content.  Janey Woodward, CNP

## 2024-11-19 ENCOUNTER — APPOINTMENT (OUTPATIENT)
Dept: PHYSICAL THERAPY | Facility: CLINIC | Age: 29
End: 2024-11-19
Payer: COMMERCIAL

## 2024-11-21 ENCOUNTER — LAB (OUTPATIENT)
Dept: LAB | Facility: LAB | Age: 29
End: 2024-11-21
Payer: COMMERCIAL

## 2024-11-21 DIAGNOSIS — Z13.1 SCREENING FOR DIABETES MELLITUS (DM): ICD-10-CM

## 2024-11-21 DIAGNOSIS — Z13.220 SCREENING FOR LIPID DISORDERS: ICD-10-CM

## 2024-11-21 DIAGNOSIS — Z00.00 ENCOUNTER FOR ANNUAL PHYSICAL EXAM: ICD-10-CM

## 2024-11-21 DIAGNOSIS — Z13.29 SCREENING FOR THYROID DISORDER: ICD-10-CM

## 2024-11-21 DIAGNOSIS — Z13.21 ENCOUNTER FOR VITAMIN DEFICIENCY SCREENING: ICD-10-CM

## 2024-11-21 LAB
25(OH)D3 SERPL-MCNC: 29 NG/ML (ref 30–100)
ALBUMIN SERPL BCP-MCNC: 4.4 G/DL (ref 3.4–5)
ALP SERPL-CCNC: 45 U/L (ref 33–110)
ALT SERPL W P-5'-P-CCNC: 11 U/L (ref 7–45)
ANION GAP SERPL CALCULATED.3IONS-SCNC: 9 MMOL/L (ref 10–20)
AST SERPL W P-5'-P-CCNC: 13 U/L (ref 9–39)
BASOPHILS # BLD AUTO: 0.08 X10*3/UL (ref 0–0.1)
BASOPHILS NFR BLD AUTO: 1 %
BILIRUB SERPL-MCNC: 0.7 MG/DL (ref 0–1.2)
BUN SERPL-MCNC: 11 MG/DL (ref 6–23)
CALCIUM SERPL-MCNC: 9.1 MG/DL (ref 8.6–10.3)
CHLORIDE SERPL-SCNC: 105 MMOL/L (ref 98–107)
CHOLEST SERPL-MCNC: 187 MG/DL (ref 0–199)
CHOLEST/HDLC SERPL: 3 {RATIO}
CO2 SERPL-SCNC: 27 MMOL/L (ref 21–32)
CREAT SERPL-MCNC: 0.79 MG/DL (ref 0.5–1.05)
EGFRCR SERPLBLD CKD-EPI 2021: >90 ML/MIN/1.73M*2
EOSINOPHIL # BLD AUTO: 0.28 X10*3/UL (ref 0–0.7)
EOSINOPHIL NFR BLD AUTO: 3.6 %
ERYTHROCYTE [DISTWIDTH] IN BLOOD BY AUTOMATED COUNT: 12.2 % (ref 11.5–14.5)
EST. AVERAGE GLUCOSE BLD GHB EST-MCNC: 88 MG/DL
GLUCOSE SERPL-MCNC: 88 MG/DL (ref 74–99)
HBA1C MFR BLD: 4.7 %
HCT VFR BLD AUTO: 42.4 % (ref 36–46)
HDLC SERPL-MCNC: 61.5 MG/DL
HGB BLD-MCNC: 14.5 G/DL (ref 12–16)
IMM GRANULOCYTES # BLD AUTO: 0.02 X10*3/UL (ref 0–0.7)
IMM GRANULOCYTES NFR BLD AUTO: 0.3 % (ref 0–0.9)
LDLC SERPL CALC-MCNC: 108 MG/DL
LYMPHOCYTES # BLD AUTO: 2.72 X10*3/UL (ref 1.2–4.8)
LYMPHOCYTES NFR BLD AUTO: 34.5 %
MCH RBC QN AUTO: 31 PG (ref 26–34)
MCHC RBC AUTO-ENTMCNC: 34.2 G/DL (ref 32–36)
MCV RBC AUTO: 91 FL (ref 80–100)
MONOCYTES # BLD AUTO: 0.59 X10*3/UL (ref 0.1–1)
MONOCYTES NFR BLD AUTO: 7.5 %
NEUTROPHILS # BLD AUTO: 4.19 X10*3/UL (ref 1.2–7.7)
NEUTROPHILS NFR BLD AUTO: 53.1 %
NON HDL CHOLESTEROL: 126 MG/DL (ref 0–149)
NRBC BLD-RTO: 0 /100 WBCS (ref 0–0)
PLATELET # BLD AUTO: 254 X10*3/UL (ref 150–450)
POTASSIUM SERPL-SCNC: 4.3 MMOL/L (ref 3.5–5.3)
PROT SERPL-MCNC: 7 G/DL (ref 6.4–8.2)
RBC # BLD AUTO: 4.68 X10*6/UL (ref 4–5.2)
SODIUM SERPL-SCNC: 137 MMOL/L (ref 136–145)
TRIGL SERPL-MCNC: 86 MG/DL (ref 0–149)
TSH SERPL-ACNC: 1.66 MIU/L (ref 0.44–3.98)
VLDL: 17 MG/DL (ref 0–40)
WBC # BLD AUTO: 7.9 X10*3/UL (ref 4.4–11.3)

## 2024-11-21 PROCEDURE — 36415 COLL VENOUS BLD VENIPUNCTURE: CPT

## 2024-11-21 PROCEDURE — 82306 VITAMIN D 25 HYDROXY: CPT

## 2024-11-21 PROCEDURE — 84443 ASSAY THYROID STIM HORMONE: CPT

## 2024-11-21 PROCEDURE — 80061 LIPID PANEL: CPT

## 2024-11-21 PROCEDURE — 85025 COMPLETE CBC W/AUTO DIFF WBC: CPT

## 2024-11-21 PROCEDURE — 83036 HEMOGLOBIN GLYCOSYLATED A1C: CPT

## 2024-11-21 PROCEDURE — 80053 COMPREHEN METABOLIC PANEL: CPT

## 2024-11-26 ENCOUNTER — TREATMENT (OUTPATIENT)
Dept: PHYSICAL THERAPY | Facility: CLINIC | Age: 29
End: 2024-11-26
Payer: COMMERCIAL

## 2024-11-26 DIAGNOSIS — M62.89 MUSCLE TIGHTNESS: ICD-10-CM

## 2024-11-26 DIAGNOSIS — Z87.768: ICD-10-CM

## 2024-11-26 DIAGNOSIS — M25.551 RIGHT HIP PAIN: ICD-10-CM

## 2024-11-26 DIAGNOSIS — R10.2 FEMALE PELVIC PAIN: Primary | ICD-10-CM

## 2024-11-26 DIAGNOSIS — M62.81 MUSCLE WEAKNESS: ICD-10-CM

## 2024-11-26 PROCEDURE — 97140 MANUAL THERAPY 1/> REGIONS: CPT | Mod: GP | Performed by: PHYSICAL THERAPIST

## 2024-11-26 PROCEDURE — 97110 THERAPEUTIC EXERCISES: CPT | Mod: GP | Performed by: PHYSICAL THERAPIST

## 2024-11-26 NOTE — PROGRESS NOTES
Physical Therapy  Physical Therapy Pelvic Floor    Name: Zara Corey  MRN: [id number]  : 1995  Encounter Date: 2024  Visit Count: 2     Time Calculation  Start Time: 1330  Stop Time: 1430  Time Calculation (min): 60 min    PT Therapeutic Procedures Time Entry  Manual Therapy Time Entry: 40  Therapeutic Exercise Time Entry: 20    Insurance: O  Visit # 2 of 33 for  (7 used previously ortho PT)    Current Problem:  1. Female pelvic pain  Follow Up In Physical Therapy      2. Right hip pain  Follow Up In Physical Therapy      3. Personal history of developmental dysplasia of the hip  Follow Up In Physical Therapy      4. Muscle tightness  Follow Up In Physical Therapy      5. Muscle weakness  Follow Up In Physical Therapy          General     Precautions     Vital Signs     Pain       Subjective  Chief Complaint: pain with intercourse, chronic UTI's (symptoms: fullness, burning) - sometimes test coming back negative  24: R hip Asp/Inj  24: R - Pelvic Hardware Removal  2023: R hip arthroscopy & REJI  Onset:   GEE: unknown    : pain with intercourse and frequent UTIs & R hip pain around this same time  - family hx of hip dysplasia  - continued R hip and pelvic pain (Injection & PT-main)   *Massage Gun, Manual Therapy - temporarily helpful   *Previously completed strengthening PT - mostly body weight - flared with increased pain  - history: pain in the rectum, began in  - same as hip and pain with intercourse, episode of fecal incontinence with intercourse (on top, may have been in climax) - hx of anal fissures  - currently: no episodes of FI since surgery, but in rectum during menstrual cycle - no anal fissure association during cycle    Today:   - more hip pain - R hip - anterior hip  - increased stress at work and a lot of overtime  - all bladder and sex symptoms about the same  - drinking more water & less coffee/carbonated beverages    PAIN  Intensity (0-10): 2 (5/10)  - can be limping  Location: R hip  Description: aching - ASIS, pinching - side and under  Meralgia Paresthetica: originally hypersensitive, improved to now more dullness in sensation    Prior Level of Function (PLOF)  Exercise/Physical Activity: strength training: weight lifting, running, swimming (30#)  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 2020/21 around the same time as hip pain - still present now, less intense, pain with deep penetration, bleeding following sex, (utilizing lubricant: Uberlube - not associated with dryness) - able to achieve orgasm  Frequency? Deep penetration  Positional? Supine, legs up & hands/knees - deeper penetration  Marinoff Scale? 2-3   0 - no pain during intercourse    1 - pain during intercourse that does not prevent intercourse   X 2 - pain during intercourse that interrupts intercourse   X 3 - pain that prevents intercourse - sometimes     BLADDER  Frequency of Urination  Daytime: morning: every 30min, daytime classes: holding 2 hours  Nighttime: 0  Screening = Recurrent infections/UTIs? Yes - symptoms when you urinate  Other Symptoms   Trouble initiating urine stream   X Urine stream is intermittent or slow - stream spraying    Trouble emptying bladder completely   X Dribbling after urination   X Straining or pushing to empty    Trouble feeling bladder urge/fullness   X Trouble holding urine when you get an urge - occasional leak   Urinary Incontinence/Leakage  Present with cough and/or sneeze? No  Present with physical activity and/or exertion? No  Do you wear any barriers, such as pantishields or pads? No  Average Fluid Intake (glasses/day): coffee - 4c, water - 25fl x2, sparkling water     BOWEL  Frequency of Bowel Movements: everyday (pretty severe constipation as a child)  Stool Form: [place] Scale = loose at times after  coffee  Management Techniques: smoothie: acaci sengal fiber & kefir   Average Fiber Intake (per day): good      Objective: PFM-O, localized joint hypermobility (lumbar/Acet-Fem), ant soft tiss tightness, hip weakness  Posture:   SL Stance: hip drop on R  DL Squat: PPT at depth, weight shift off of R leg  Gait: ligament dominant  Standing Lumbar Mobility: hypermobility  Standing Pelvic Alignment: R iliac crest elevated  SLR: global co-activation of core  Pelvic Positioning at rest: PPT  Hip PROM: hypermobility bilaterally  MMT: sup hip: 4-/5 bilaterally  - Hamstrings: end range tightness  - Piriformis: end range tightness  Diaphragmatic Breathing: fair  Rib Palpation/Positioning: down-rung  Assess Abdomen  Rectus Abdominus: global tightness  Obliques: global tightness  Iliopsoas: global tightness    Treatments:  - bladder irritants discussed (give paper next visit) - lessen coffee and sparking corcoran    STM/MFR/Jt Mobilization/PROM/Stretching: thoracic-lumbar-pelvis-hips    Access Code: [email or web address]  - Supine Pelvic Floor Stretch  - 3-5 x weekly - 3 sets - 5 breaths hold  - Supine Figure 4 Piriformis Stretch  - 3-5 x weekly - 3 sets - 20 seconds hold  - Supine Hamstring Stretch with Strap  - 3-5 x weekly - 3 sets - 20 seconds hold  - Cat Cow  - 3-5 x weekly - 10 reps  - Tail Wag  - 3-5 x weekly - 10 reps  - Child's Pose Stretch  - 3-5 x weekly - 1-3 minutes hold  - Lying Prone  - 3-5 x weekly - 5-10 minutes hold  - Prone Quadriceps Stretch with Strap  - 3-5 x weekly - 3 sets - 20 seconds hold  - Half Kneeling Hip Flexor Stretch  - 3-5 x weekly - 3 sets - 20 seconds hold    Plan for Next Visit:   - abdominal-pelvic manual release  - PF relaxation and stretching  - open to internal assessment in future    Assessment: Patient presents with signs and symptoms consistent with dyspareunia, PF dysfunction, muscle tightness, muscle weakness, hx of hip dysplasia and resullting R hip arthroscopy and REJI Jan 2023 with hardware  removal Feb 2024, resulting in limited participation in pain-free ADLs and inability to perform at their prior level of function. Pt would benefit from physical therapy to address the impairments found & listed previously in the objective section in order to return to safe and pain-free ADLs and prior level of function.  - tolerated manual therapy well with reported reduction in symptoms, improved posture, and gait  - tolerated stretching exercises well    Plan:   Planned Interventions include: therapeutic exercise, self-care home management, manual therapy, therapeutic activities, gait training, neuromuscular coordination, aquatic therapy  Patient and/or family understands and agrees with goals and plan documented: yes  Frequency: 2x/month  Duration: 6 months     Angelica Mark, PT

## 2024-12-03 ENCOUNTER — APPOINTMENT (OUTPATIENT)
Dept: PHYSICAL THERAPY | Facility: CLINIC | Age: 29
End: 2024-12-03
Payer: COMMERCIAL

## 2024-12-10 NOTE — PROGRESS NOTES
"Subjective    Zara Corey is a 28 y.o. female who is here for a routine exam. Periods are regular. No intermenstrual bleeding, spotting, or discharge.    Current contraception: Paragard IUD placed 3-4 years ago  Last pap: Not in chart  Last mammogram: n/a    PMH significant for left ovarian cystectomy, pelvic pain and dyspareunia(doing pelvic floor PT), hip surgery    Social History:      Review of Systems  Constitutional: no fever, no chills, no recent weight gain, no recent weight loss and no fatigue.   Eyes: no eye pain, no vision problems and no dryness of the eyes.   ENT: no hearing loss, no nosebleeds and no sinus congestion.   Cardiovascular: no chest pain, no palpitations and no orthopnea.   Respiratory: no shortness of breath, no cough and no wheezing.   Gastrointestinal: no abdominal pain, no constipation, no nausea, no diarrhea and no vomiting.   Genitourinary: no dysuria, no urinary incontinence, no vaginal dryness, no vaginal itching, no dyspareunia, no pelvic pain, no dysmenorrhea, no sexual problems, no change in urinary frequency, no vaginal discharge, no unexplained vaginal bleeding and no lesion/sore.   Musculoskeletal: no back pain, no joint swelling and no leg edema.   Integumentary: no rashes, no skin lesions, no nipple discharge, no breast pain and no breast lump.   Neurological: no headache, no numbness and no dizziness.   Psychiatric: no sleep disturbances, no anxiety and no depression.   Endocrine: no hot flashes, no loss of hair and no hirsutism.   Hematologic/Lymphatic: no swollen glands, no tendency for easy bleeding and no tendency for easy bruising.          Objective   /79   Ht 1.702 m (5' 7\")   Wt 80.7 kg (178 lb)   LMP 11/20/2024   BMI 27.88 kg/m²        General:   Alert and oriented, in no acute distress   Neck: Supple. No visible thyromegaly.    Breast/Axilla: Normal to palpation bilaterally without masses, skin changes, or nipple discharge.    Abdomen: Soft, " non-tender, without masses or organomegaly   Vulva: Normal architecture without erythema, masses, or lesions.    Vagina: Normal mucosa without lesions, masses, or atrophy. No abnormal vaginal discharge.    Cervix: Normal without masses, lesions, or signs of cervicitis.  IUD strings visible   Uterus: Normal mobile, non-enlarged uterus    Adnexa: Normal without masses or lesions   Pelvic Floor No POP noted. No high tone pelvic floor    Psych Normal affect. Normal mood.          Assessment/Plan   Annual exam - discussed diet, exercise, self breast awareness, mammogram and pap guidelines.

## 2024-12-11 ENCOUNTER — LAB (OUTPATIENT)
Dept: LAB | Facility: LAB | Age: 29
End: 2024-12-11
Payer: COMMERCIAL

## 2024-12-11 ENCOUNTER — APPOINTMENT (OUTPATIENT)
Dept: OBSTETRICS AND GYNECOLOGY | Facility: CLINIC | Age: 29
End: 2024-12-11
Payer: COMMERCIAL

## 2024-12-11 VITALS
SYSTOLIC BLOOD PRESSURE: 121 MMHG | BODY MASS INDEX: 27.94 KG/M2 | WEIGHT: 178 LBS | DIASTOLIC BLOOD PRESSURE: 79 MMHG | HEIGHT: 67 IN

## 2024-12-11 DIAGNOSIS — Z01.419 ENCOUNTER FOR ANNUAL ROUTINE GYNECOLOGICAL EXAMINATION: Primary | ICD-10-CM

## 2024-12-11 DIAGNOSIS — Z12.4 ENCOUNTER FOR PAPANICOLAOU SMEAR FOR CERVICAL CANCER SCREENING: ICD-10-CM

## 2024-12-11 DIAGNOSIS — Z11.3 ROUTINE SCREENING FOR STI (SEXUALLY TRANSMITTED INFECTION): ICD-10-CM

## 2024-12-11 LAB
HBV SURFACE AG SERPL QL IA: NONREACTIVE
HCV AB SER QL: NONREACTIVE
HIV 1+2 AB+HIV1 P24 AG SERPL QL IA: NONREACTIVE
TREPONEMA PALLIDUM IGG+IGM AB [PRESENCE] IN SERUM OR PLASMA BY IMMUNOASSAY: NONREACTIVE

## 2024-12-11 PROCEDURE — 3008F BODY MASS INDEX DOCD: CPT | Performed by: OBSTETRICS & GYNECOLOGY

## 2024-12-11 PROCEDURE — 99385 PREV VISIT NEW AGE 18-39: CPT | Performed by: OBSTETRICS & GYNECOLOGY

## 2024-12-11 PROCEDURE — 86803 HEPATITIS C AB TEST: CPT

## 2024-12-11 PROCEDURE — 87389 HIV-1 AG W/HIV-1&-2 AB AG IA: CPT

## 2024-12-11 PROCEDURE — 87491 CHLMYD TRACH DNA AMP PROBE: CPT

## 2024-12-11 PROCEDURE — 87591 N.GONORRHOEAE DNA AMP PROB: CPT

## 2024-12-11 PROCEDURE — 36415 COLL VENOUS BLD VENIPUNCTURE: CPT

## 2024-12-11 PROCEDURE — 87661 TRICHOMONAS VAGINALIS AMPLIF: CPT

## 2024-12-11 PROCEDURE — 87340 HEPATITIS B SURFACE AG IA: CPT

## 2024-12-11 PROCEDURE — 1036F TOBACCO NON-USER: CPT | Performed by: OBSTETRICS & GYNECOLOGY

## 2024-12-11 PROCEDURE — 87624 HPV HI-RISK TYP POOLED RSLT: CPT

## 2024-12-11 PROCEDURE — 86780 TREPONEMA PALLIDUM: CPT

## 2024-12-11 ASSESSMENT — COLUMBIA-SUICIDE SEVERITY RATING SCALE - C-SSRS
6. HAVE YOU EVER DONE ANYTHING, STARTED TO DO ANYTHING, OR PREPARED TO DO ANYTHING TO END YOUR LIFE?: NO
2. HAVE YOU ACTUALLY HAD ANY THOUGHTS OF KILLING YOURSELF?: NO
1. IN THE PAST MONTH, HAVE YOU WISHED YOU WERE DEAD OR WISHED YOU COULD GO TO SLEEP AND NOT WAKE UP?: NO

## 2024-12-11 ASSESSMENT — PATIENT HEALTH QUESTIONNAIRE - PHQ9
2. FEELING DOWN, DEPRESSED OR HOPELESS: NOT AT ALL
SUM OF ALL RESPONSES TO PHQ9 QUESTIONS 1 AND 2: 0
1. LITTLE INTEREST OR PLEASURE IN DOING THINGS: NOT AT ALL

## 2024-12-12 LAB — T VAGINALIS RRNA SPEC QL NAA+PROBE: NEGATIVE

## 2024-12-13 ENCOUNTER — TREATMENT (OUTPATIENT)
Dept: PHYSICAL THERAPY | Facility: CLINIC | Age: 29
End: 2024-12-13
Payer: COMMERCIAL

## 2024-12-13 DIAGNOSIS — Z87.768: ICD-10-CM

## 2024-12-13 DIAGNOSIS — R10.2 FEMALE PELVIC PAIN: ICD-10-CM

## 2024-12-13 DIAGNOSIS — M62.81 MUSCLE WEAKNESS: ICD-10-CM

## 2024-12-13 DIAGNOSIS — M62.89 MUSCLE TIGHTNESS: ICD-10-CM

## 2024-12-13 LAB
C TRACH RRNA SPEC QL NAA+PROBE: NEGATIVE
N GONORRHOEA DNA SPEC QL PROBE+SIG AMP: NEGATIVE

## 2024-12-13 PROCEDURE — 97140 MANUAL THERAPY 1/> REGIONS: CPT | Mod: GP | Performed by: PHYSICAL THERAPIST

## 2024-12-13 PROCEDURE — 97535 SELF CARE MNGMENT TRAINING: CPT | Mod: GP | Performed by: PHYSICAL THERAPIST

## 2024-12-13 NOTE — PROGRESS NOTES
Physical Therapy  Physical Therapy Pelvic Floor    Name: Zara Corey  MRN: [id number]  : 1995  Encounter Date: 2024  Visit Count: 3     Time Calculation  Start Time: 0800  Stop Time: 0900  Time Calculation (min): 60 min    PT Therapeutic Procedures Time Entry  Manual Therapy Time Entry: 45  Self-Care/Home Mgmt Training: 10  Insurance: MMO  Visit # 3 of 33 for  (7 used previously ortho PT)    Current Problem:  1. Female pelvic pain  Follow Up In Physical Therapy      2. Personal history of developmental dysplasia of the hip  Follow Up In Physical Therapy      3. Muscle tightness  Follow Up In Physical Therapy      4. Muscle weakness  Follow Up In Physical Therapy          General     Precautions     Vital Signs     Pain       Subjective  Chief Complaint: pain with intercourse, chronic UTI's (symptoms: fullness, burning) - sometimes test coming back negative  24: R hip Asp/Inj  24: R - Pelvic Hardware Removal  2023: R hip arthroscopy & REJI  Onset:   GEE: unknown    : pain with intercourse and frequent UTIs & R hip pain around this same time  - family hx of hip dysplasia  - continued R hip and pelvic pain (Injection & PT-main)   *Massage Gun, Manual Therapy - temporarily helpful   *Previously completed strengthening PT - mostly body weight - flared with increased pain  - history: pain in the rectum, began in  - same as hip and pain with intercourse, episode of fecal incontinence with intercourse (on top, may have been in climax) - hx of anal fissures  - currently: no episodes of FI since surgery, but in rectum during menstrual cycle - no anal fissure association during cycle    Today:   - felt really good for about 5 hours after last session, pain then returned slowly  - felt some relief for about 1 week  - pain feels less diffuse and more localized to the R deep hip flexor region (points near iliopsoas)  - bladder symptoms: up & down, recent stressors with work,  bladder spasms (could be related to increased coffee)   - pain with intercourse: okay - lengthening during sex (seems to be helpful)    PAIN  Intensity (0-10): 2 (5/10) - can be limping  Location: R hip  Description: aching - ASIS, pinching - side and under  Meralgia Paresthetica: originally hypersensitive, improved to now more dullness in sensation    Prior Level of Function (PLOF)  Exercise/Physical Activity: strength training: weight lifting, running, swimming (30#)  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 2020/21 around the same time as hip pain - still present now, less intense, pain with deep penetration, bleeding following sex, (utilizing lubricant: Uberlube - not associated with dryness) - able to achieve orgasm  Frequency? Deep penetration  Positional? Supine, legs up & hands/knees - deeper penetration  Marinoff Scale? 2-3   0 - no pain during intercourse    1 - pain during intercourse that does not prevent intercourse   X 2 - pain during intercourse that interrupts intercourse   X 3 - pain that prevents intercourse - sometimes     BLADDER  Frequency of Urination  Daytime: morning: every 30min, daytime classes: holding 2 hours  Nighttime: 0  Screening = Recurrent infections/UTIs? Yes - symptoms when you urinate  Other Symptoms   Trouble initiating urine stream   X Urine stream is intermittent or slow - stream spraying    Trouble emptying bladder completely   X Dribbling after urination   X Straining or pushing to empty    Trouble feeling bladder urge/fullness   X Trouble holding urine when you get an urge - occasional leak   Urinary Incontinence/Leakage  Present with cough and/or sneeze? No  Present with physical activity and/or exertion? No  Do you wear any barriers, such as pantishields or pads? No  Average Fluid Intake (glasses/day): coffee - 4c, water -  25fl x2, sparkling water     BOWEL  Frequency of Bowel Movements: everyday (pretty severe constipation as a child)  Stool Form: [place] Scale = loose at times after coffee  Management Techniques: smoothie: acaci sengal fiber & kefir   Average Fiber Intake (per day): good      Objective: PFM-O, localized joint hypermobility (lumbar/Acet-Fem), ant soft tiss tightness, hip weakness  Posture:   SL Stance: hip drop on R  DL Squat: PPT at depth, weight shift off of R leg  Gait: ligament dominant  Standing Lumbar Mobility: hypermobility  Standing Pelvic Alignment: R iliac crest elevated  SLR: global co-activation of core  Pelvic Positioning at rest: PPT  Hip PROM: hypermobility bilaterally  MMT: sup hip: 4-/5 bilaterally  - Hamstrings: end range tightness  - Piriformis: end range tightness  Diaphragmatic Breathing: fair  Rib Palpation/Positioning: down-rung  Assess Abdomen  Rectus Abdominus: global tightness  Obliques: global tightness  Iliopsoas: global tightness    Treatments:  - bladder irritants discussed (give paper next visit) - lessen coffee and sparking corcoran  - continued emphasis on stretching exercises    STM/MFR/Jt Mobilization/PROM/Stretching: thoracic-lumbar-pelvis-hips    Tummy Time: lying on your stomach, every day, 5-15 minutes  Supine Hip Flexor stretch with leg off side of table  Deep breaths with abdominal and pelvic floor lengthening, 3-5 breaths, 3-5x/day    Access Code: [email or web address]  - Supine Pelvic Floor Stretch  - 3-5 x weekly - 3 sets - 5 breaths hold  - Supine Figure 4 Piriformis Stretch  - 3-5 x weekly - 3 sets - 20 seconds hold  - Supine Hamstring Stretch with Strap  - 3-5 x weekly - 3 sets - 20 seconds hold  - Cat Cow  - 3-5 x weekly - 10 reps  - Tail Wag  - 3-5 x weekly - 10 reps  - Child's Pose Stretch  - 3-5 x weekly - 1-3 minutes hold  - Lying Prone  - 3-5 x weekly - 5-10 minutes hold  - Prone Quadriceps Stretch with Strap  - 3-5 x weekly - 3 sets - 20 seconds hold  - Half Kneeling Hip Flexor  Stretch  - 3-5 x weekly - 3 sets - 20 seconds hold    Plan for Next Visit:   - abdominal-pelvic manual release  - PF relaxation and stretching  - open to internal assessment in future    Assessment: Patient presents with signs and symptoms consistent with dyspareunia, PF dysfunction, muscle tightness, muscle weakness, hx of hip dysplasia and resullting R hip arthroscopy and REJI Jan 2023 with hardware removal Feb 2024, resulting in limited participation in pain-free ADLs and inability to perform at their prior level of function. Pt would benefit from physical therapy to address the impairments found & listed previously in the objective section in order to return to safe and pain-free ADLs and prior level of function.  - tolerated manual therapy well with reported reduction in symptoms, improved posture, and gait  - tolerated stretching exercises well    Plan:   Planned Interventions include: therapeutic exercise, self-care home management, manual therapy, therapeutic activities, gait training, neuromuscular coordination, aquatic therapy  Patient and/or family understands and agrees with goals and plan documented: yes  Frequency: 2x/month  Duration: 6 months     Angelica Mark, PT

## 2024-12-16 ENCOUNTER — OFFICE VISIT (OUTPATIENT)
Dept: DERMATOLOGY | Facility: CLINIC | Age: 29
End: 2024-12-16
Payer: COMMERCIAL

## 2024-12-16 DIAGNOSIS — L71.0 PERIORAL DERMATITIS: ICD-10-CM

## 2024-12-16 DIAGNOSIS — B07.8 COMMON WART: ICD-10-CM

## 2024-12-16 DIAGNOSIS — R20.9 DISTURBANCE OF SKIN SENSATION: ICD-10-CM

## 2024-12-16 DIAGNOSIS — L57.9 SKIN CHANGES DUE TO CHRONIC EXPOSURE TO NONIONIZING RADIATION: ICD-10-CM

## 2024-12-16 DIAGNOSIS — L81.4 LENTIGO: ICD-10-CM

## 2024-12-16 DIAGNOSIS — D22.9 BENIGN NEVUS: Primary | ICD-10-CM

## 2024-12-16 PROCEDURE — 17110 DESTRUCTION B9 LES UP TO 14: CPT | Performed by: NURSE PRACTITIONER

## 2024-12-16 PROCEDURE — 1036F TOBACCO NON-USER: CPT | Performed by: NURSE PRACTITIONER

## 2024-12-16 PROCEDURE — 99203 OFFICE O/P NEW LOW 30 MIN: CPT | Performed by: NURSE PRACTITIONER

## 2024-12-16 RX ORDER — PIMECROLIMUS 10 MG/G
CREAM TOPICAL
Qty: 30 G | Refills: 2 | Status: SHIPPED | OUTPATIENT
Start: 2024-12-16

## 2024-12-16 NOTE — PATIENT INSTRUCTIONS

## 2024-12-16 NOTE — PROGRESS NOTES
Subjective     Zara Corey is a 28 y.o. female who presents for the following: Suspicious Skin Lesion (Left leg).     Left leg lesion is irritating.     Review of Systems:  No other skin or systemic complaints other than what is documented elsewhere in the note.    The following portions of the chart were reviewed this encounter and updated as appropriate:   Tobacco  Allergies  Meds  Problems  Med Hx  Surg Hx         Skin Cancer History  No skin cancer on file.      Specialty Problems    None       Objective   Well appearing patient in no apparent distress; mood and affect are within normal limits.    A full examination was performed including scalp, head, eyes, ears, nose, lips, neck, chest, axillae, abdomen, back, buttocks, bilateral upper extremities, bilateral lower extremities, hands, feet, fingers, toes, fingernails, and toenails. All findings within normal limits unless otherwise noted below.      Assessment/Plan   1. Benign nevus (2)  Generalized, Left Upper Back  Scattered, uniform and benign-appearing, regular brown melanocytic papules and macules.    Left posterior shoulder has a 10 x 7 mm irregular shape uniform tan macule with mixture of globular reticular pattern throughout.     The present appearance of the lesion is not worrisome but it should continue to be observed and testing/treatment may be warranted if change occurs.    Patient reports noticing left shoulder nevus a few years ago. In that time, she denies change in shape, size or color. Has features of congenital nevus. She is not sure how long it has been present. Given the irregular shape  and size with benign dermoscopic features, advised monitoring. Encouraged patient to take a photograph of the area and recheck every 1-2 months. Patient will have her  photo tonight and she will monitor and notify me if it changes shape size or color.     Related Procedures  Follow Up In Dermatology - Established Patient    2.  Lentigo  Scattered tan macules in sun-exposed areas.    A solar lentigo (plural, solar lentigines), also known as a sun-induced freckle or senile lentigo, is a dark (hyperpigmented) lesion caused by natural or artificial ultraviolet (UV) light. Solar lentigines may be single or multiple. This type of lentigo is different from a simple lentigo (lentigo simplex) because it is caused by exposure to UV light. Solar lentigines are benign, but they do indicate excessive sun exposure, a risk factor for the development of skin cancer.    To prevent solar lentigines, avoid exposure to sunlight in midday (10 AM to 3 PM), wear sun-protective clothing (tightly woven clothes and hats), and apply sunscreen (SPF 30 UVA and UVB block).    The present appearance of the lesion is not worrisome but it should continue to be observed and testing/treatment may be warranted if change occurs.    Related Procedures  Follow Up In Dermatology - Established Patient    3. Skin changes due to chronic exposure to nonionizing radiation  Actinic changes in the form of freckles, lentigines and hyper/hypopigmentation     ABCDEs of melanoma and atypical moles were discussed with the patient.    Patient was instructed to perform monthly self skin examination.  We recommended that the patient have regular full skin exams given an increased risk of subsequent skin cancers.    The patient was instructed to use sun protective behaviors including use of broad spectrum sunscreens and sun protective clothing to reduce risk of skin cancers.    Warning signs of non-melanoma skin cancer discussed.    Related Procedures  Follow Up In Dermatology - Established Patient    4. Common wart  Left Thigh - Posterior  Verrucous papules    -I reviewed the etiology of warts in detail with the patient. Discussed that this is a viral infection of the skin. Warts are difficult to eradicate as they occur in areas of relative immune incompetent skin. Treatments are aimed at  creating local irritation to the skin, in order to activate the body's immune system to resolve the viral infection.   -Treatment options discussed with the family including cryotherapy therapies.  -Today elect to do the following:   -Cryotherapy to the involved areas.  Reviewed SE of cryotherapy including pain, blistering, and potential scarring/dyspigmentation.     Destr of lesion - Left Thigh - Posterior  Complexity: simple    Destruction method: cryotherapy    Informed consent: discussed and consent obtained    Lesion destroyed using liquid nitrogen: Yes    Region frozen until ice ball extended beyond lesion: Yes    Cryotherapy cycles:  2  Outcome: patient tolerated procedure well with no complications    Post-procedure details: wound care instructions given      Related Procedures  Follow Up In Dermatology - Established Patient    5. Disturbance of skin sensation    Related Procedures  Follow Up In Dermatology - Established Patient    6. Perioral dermatitis  Perioral  A few faint pink papules and macules that are semi scaly to the perioral area.     Patient reports for the past few vega developing a rash that comes and goes to the chin/perioral area and also around the nose and eyelids as well. Not really itchy, some what mildly irritated. Eyelids and perinasal area clear on exam today.     Perioral dermatitis is an acne-like condition that typically occurs around the mouth, nose, and/or around the eyes (also known as periorbital dermatitis). Some possible causes are the use of topical corticosteroid creams, steroid nasal sprays (Flonase, Rhinocort), steroid inhalers (Pulmicort, Flovent) certain cosmetic products, oral contraceptives, and fluoride and anti-tartar ingredients in dental products. Perioral dermatitis also tends to occur in those prone to eczema.    SELF CARE GUIDELINES  Stop all face creams, lotions, cosmetics, and sunscreens being used.  Stop any dental products with fluoride and anti-tartar  ingredients.  Wash with warm water alone until the rash improves, and then use a gentle cleanser such as Dove, CeraVe, or Cetaphil to clean your face.  If you have been applying a steroid-containing product, this will need to be stopped. However, if it has been used for a long time, it may cause the perioral dermatitis to get worse for a period.     PLAN  Pimecrolimus BID.     Reported adverse reactions of topical calcineurin inhibitors include, but are not limited to, burning, itching, rash or infection at the application site. Rare adverse effects include viral infections or allergic reactions. There is a black-box association with potential risk for lymphoma; however, large studies have shown this medication is safe for children and adults for maintenance use for skin conditions as steroid-sparing therapy.      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 Advised to avoid applying pimecrolimus in the area as it could cause a flare up. She verbalized understanding and agreement.     Related Medications  pimecrolimus (Elidel) 1 % cream  Apply to affected areas once or twice daily        Return to clinic in 1 year for skin check/follow up or sooner if needed

## 2024-12-19 ENCOUNTER — APPOINTMENT (OUTPATIENT)
Dept: PHYSICAL THERAPY | Facility: CLINIC | Age: 29
End: 2024-12-19
Payer: COMMERCIAL

## 2024-12-23 ENCOUNTER — APPOINTMENT (OUTPATIENT)
Dept: PHYSICAL THERAPY | Facility: CLINIC | Age: 29
End: 2024-12-23
Payer: COMMERCIAL

## 2024-12-26 LAB
CYTOLOGY CMNT CVX/VAG CYTO-IMP: NORMAL
HPV HR 12 DNA GENITAL QL NAA+PROBE: NEGATIVE
HPV HR GENOTYPES PNL CVX NAA+PROBE: NEGATIVE
HPV16 DNA SPEC QL NAA+PROBE: NEGATIVE
HPV18 DNA SPEC QL NAA+PROBE: NEGATIVE
LAB AP HPV GENOTYPE QUESTION: YES
LAB AP HPV HR: NORMAL
LAB AP PAP ADDITIONAL TESTS: NORMAL
LAB AP PREVIOUS ABNORMAL HISTORY: NORMAL
LABORATORY COMMENT REPORT: NORMAL
PATH REPORT.TOTAL CANCER: NORMAL

## 2024-12-27 ENCOUNTER — TREATMENT (OUTPATIENT)
Dept: PHYSICAL THERAPY | Facility: CLINIC | Age: 29
End: 2024-12-27
Payer: COMMERCIAL

## 2024-12-27 DIAGNOSIS — M62.89 MUSCLE TIGHTNESS: ICD-10-CM

## 2024-12-27 DIAGNOSIS — R10.2 FEMALE PELVIC PAIN: Primary | ICD-10-CM

## 2024-12-27 DIAGNOSIS — M62.81 MUSCLE WEAKNESS: ICD-10-CM

## 2024-12-27 DIAGNOSIS — Z87.768: ICD-10-CM

## 2024-12-27 DIAGNOSIS — M25.551 RIGHT HIP PAIN: ICD-10-CM

## 2024-12-27 PROCEDURE — 97140 MANUAL THERAPY 1/> REGIONS: CPT | Mod: GP | Performed by: PHYSICAL THERAPIST

## 2024-12-27 PROCEDURE — 97110 THERAPEUTIC EXERCISES: CPT | Mod: GP | Performed by: PHYSICAL THERAPIST

## 2024-12-27 NOTE — PROGRESS NOTES
Physical Therapy  Physical Therapy Pelvic Floor    Name: Zara Corey  MRN: [id number]  : 1995  Encounter Date: 2024  Visit Count: 4     Time Calculation  Start Time: 0700  Stop Time: 0800  Time Calculation (min): 60 min    PT Therapeutic Procedures Time Entry  Manual Therapy Time Entry: 45  Therapeutic Exercise Time Entry: 10  Insurance: MMO  Visit # 4 of 33 for  (7 used previously ortho PT)    Current Problem:  1. Female pelvic pain        2. Right hip pain  Follow Up In Physical Therapy      3. Muscle tightness        4. Personal history of developmental dysplasia of the hip        5. Muscle weakness            General     Precautions     Vital Signs     Pain       Subjective  Chief Complaint: pain with intercourse, chronic UTI's (symptoms: fullness, burning) - sometimes test coming back negative  24: R hip Asp/Inj  24: R - Pelvic Hardware Removal  2023: R hip arthroscopy & REJI  Onset:   GEE: unknown    : pain with intercourse and frequent UTIs & R hip pain around this same time  - family hx of hip dysplasia  - continued R hip and pelvic pain (Injection & PT-main)   *Massage Gun, Manual Therapy - temporarily helpful   *Previously completed strengthening PT - mostly body weight - flared with increased pain  - history: pain in the rectum, began in  - same as hip and pain with intercourse, episode of fecal incontinence with intercourse (on top, may have been in climax) - hx of anal fissures  - currently: no episodes of FI since surgery, but in rectum during menstrual cycle - no anal fissure association during cycle    24:   - felt really good for about 5 hours after last session, pain then returned slowly  - felt some relief for about 1 week  - pain feels less diffuse and more localized to the R deep hip flexor region (points near iliopsoas)  - bladder symptoms: up & down, recent stressors with work, bladder spasms (could be related to increased coffee)   - pain  with intercourse: okay - lengthening during sex (seems to be helpful)    12/27/24:   - doing a lot of physical work around the house (lifting/moving)  - increased pain/tightness  - hep compliance: stretching  - pain: more localized, R iliopsoas, more intense, less diffuse (previously: lateral/posterior)  - able to tolerate more movement without feeling of fatigue or heaviness     PAIN  Intensity (0-10): 2 (5/10) - can be limping  Location: R hip  Description: aching - ASIS, pinching - side and under  Meralgia Paresthetica: originally hypersensitive, improved to now more dullness in sensation    Prior Level of Function (PLOF)  Exercise/Physical Activity: strength training: weight lifting, running, swimming (30#)  Work/School: teaching kitchen coordinator at [employer] - a lot of walking and standing    Treatment Goals: less pain in hip/pelvis    OB/GYN HISTORY:   Painful [place] or vaginal penetration? 2020/21 around the same time as hip pain - still present now, less intense, pain with deep penetration, bleeding following sex, (utilizing lubricant: Uberlube - not associated with dryness) - able to achieve orgasm  Frequency? Deep penetration  Positional? Supine, legs up & hands/knees - deeper penetration  Marinoff Scale? 2-3   0 - no pain during intercourse    1 - pain during intercourse that does not prevent intercourse   X 2 - pain during intercourse that interrupts intercourse   X 3 - pain that prevents intercourse - sometimes     BLADDER  Frequency of Urination  Daytime: morning: every 30min, daytime classes: holding 2 hours  Nighttime: 0  Screening = Recurrent infections/UTIs? Yes - symptoms when you urinate  Other Symptoms   Trouble initiating urine stream   X Urine stream is intermittent or slow - stream spraying    Trouble emptying bladder completely   X Dribbling after urination   X Straining or pushing to empty    Trouble feeling bladder urge/fullness   X Trouble holding urine when you get an urge - occasional  leak   Urinary Incontinence/Leakage  Present with cough and/or sneeze? No  Present with physical activity and/or exertion? No  Do you wear any barriers, such as pantishields or pads? No  Average Fluid Intake (glasses/day): coffee - 4c, water - 25fl x2, sparkling water     BOWEL  Frequency of Bowel Movements: everyday (pretty severe constipation as a child)  Stool Form: Sutter Scale = loose at times after coffee  Management Techniques: smoothie: acaci sengal fiber & kefir   Average Fiber Intake (per day): good      Objective: PFM-O, localized joint hypermobility (lumbar/Acet-Fem), ant soft tiss tightness, hip weakness  Posture:   SL Stance: hip drop on R  DL Squat: PPT at depth, weight shift off of R leg  Gait: ligament dominant  Standing Lumbar Mobility: hypermobility  Standing Pelvic Alignment: R iliac crest elevated  SLR: global co-activation of core  Pelvic Positioning at rest: PPT  Hip PROM: hypermobility bilaterally  MMT: sup hip: 4-/5 bilaterally  - Hamstrings: end range tightness  - Piriformis: end range tightness  Diaphragmatic Breathing: fair  Rib Palpation/Positioning: down-rung  Assess Abdomen  Rectus Abdominus: global tightness  Obliques: global tightness  Iliopsoas: global tightness    Treatments:  - bladder irritants discussed (give paper next visit) - lessen coffee and sparking corcoran  - continued emphasis on stretching exercises    STM/MFR/Jt Mobilization/PROM/Stretching: thoracic-lumbar-pelvis-hips    Tummy Time: lying on your stomach, every day, 5-15 minutes  Supine Hip Flexor stretch with leg off side of table  Deep breaths with abdominal and pelvic floor lengthening, 3-5 breaths, 3-5x/day  DL Bridge (table top)     Access Code: [email or web address]  - Supine Pelvic Floor Stretch  - 3-5 x weekly - 3 sets - 5 breaths hold  - Supine Figure 4 Piriformis Stretch  - 3-5 x weekly - 3 sets - 20 seconds hold  - Supine Hamstring Stretch with Strap  - 3-5 x weekly - 3 sets - 20 seconds hold  - Cat Cow  - 3-5 x weekly  - 10 reps  - Tail Wag  - 3-5 x weekly - 10 reps  - Child's Pose Stretch  - 3-5 x weekly - 1-3 minutes hold  - Lying Prone  - 3-5 x weekly - 5-10 minutes hold  - Prone Quadriceps Stretch with Strap  - 3-5 x weekly - 3 sets - 20 seconds hold  - Half Kneeling Hip Flexor Stretch  - 3-5 x weekly - 3 sets - 20 seconds hold    Plan for Next Visit:   - abdominal-pelvic manual release  - PF relaxation and stretching  - open to internal assessment in future    Assessment: Patient presents with signs and symptoms consistent with dyspareunia, PF dysfunction, muscle tightness, muscle weakness, hx of hip dysplasia and resullting R hip arthroscopy and REJI Jan 2023 with hardware removal Feb 2024, resulting in limited participation in pain-free ADLs and inability to perform at their prior level of function. Pt would benefit from physical therapy to address the impairments found & listed previously in the objective section in order to return to safe and pain-free ADLs and prior level of function.  - tolerated manual therapy well with reported reduction in symptoms, improved posture, and gait  - tolerated stretching exercises well    Plan:   Planned Interventions include: therapeutic exercise, self-care home management, manual therapy, therapeutic activities, gait training, neuromuscular coordination, aquatic therapy  Patient and/or family understands and agrees with goals and plan documented: yes  Frequency: 2x/month  Duration: 6 months     Angelica Mark, PT

## 2024-12-31 ENCOUNTER — TELEPHONE (OUTPATIENT)
Dept: OBSTETRICS AND GYNECOLOGY | Facility: CLINIC | Age: 29
End: 2024-12-31
Payer: COMMERCIAL

## 2024-12-31 NOTE — TELEPHONE ENCOUNTER
Called patient to discuss results  Identified by name and   Lab states they see most recent lab results showing LSIL and -HPV.   Only other lab they see is from  but are unable to thoroughly evaluate these results since they were from old system, but that previous pap appears to have been ASCUS and -HPV, as well as .  Will inform provider.    ANAHI Aponte RN    ----- Message from Xander Andrade sent at 2024  9:42 AM EST -----  It looks like this patient has two pap results from her last visit, which are different.  Can you follow up with the lab?

## 2025-01-14 ENCOUNTER — APPOINTMENT (OUTPATIENT)
Dept: PHYSICAL THERAPY | Facility: CLINIC | Age: 30
End: 2025-01-14
Payer: COMMERCIAL

## 2025-01-28 ENCOUNTER — APPOINTMENT (OUTPATIENT)
Dept: PHYSICAL THERAPY | Facility: CLINIC | Age: 30
End: 2025-01-28
Payer: COMMERCIAL

## 2025-05-10 DIAGNOSIS — F41.9 ANXIETY: ICD-10-CM

## 2025-05-12 RX ORDER — ESCITALOPRAM OXALATE 10 MG/1
15 TABLET ORAL DAILY
Qty: 135 TABLET | Refills: 1 | Status: SHIPPED | OUTPATIENT
Start: 2025-05-12 | End: 2025-11-08

## 2025-05-16 ENCOUNTER — APPOINTMENT (OUTPATIENT)
Dept: PRIMARY CARE | Facility: CLINIC | Age: 30
End: 2025-05-16

## 2025-05-29 ENCOUNTER — APPOINTMENT (OUTPATIENT)
Dept: PRIMARY CARE | Facility: CLINIC | Age: 30
End: 2025-05-29
Payer: COMMERCIAL

## 2025-05-29 VITALS
HEART RATE: 72 BPM | BODY MASS INDEX: 29.88 KG/M2 | DIASTOLIC BLOOD PRESSURE: 78 MMHG | SYSTOLIC BLOOD PRESSURE: 120 MMHG | HEIGHT: 67 IN | WEIGHT: 190.4 LBS | OXYGEN SATURATION: 97 % | RESPIRATION RATE: 16 BRPM

## 2025-05-29 DIAGNOSIS — Z13.1 SCREENING FOR DIABETES MELLITUS (DM): ICD-10-CM

## 2025-05-29 DIAGNOSIS — F41.9 ANXIETY AND DEPRESSION: ICD-10-CM

## 2025-05-29 DIAGNOSIS — R07.89 CHEST PRESSURE: ICD-10-CM

## 2025-05-29 DIAGNOSIS — E55.9 VITAMIN D DEFICIENCY DISEASE: ICD-10-CM

## 2025-05-29 DIAGNOSIS — E78.00 ELEVATED LDL CHOLESTEROL LEVEL: ICD-10-CM

## 2025-05-29 DIAGNOSIS — Z09 FOLLOW-UP EXAM, 3-6 MONTHS SINCE PREVIOUS EXAM: Primary | ICD-10-CM

## 2025-05-29 DIAGNOSIS — K21.9 GASTROESOPHAGEAL REFLUX DISEASE WITHOUT ESOPHAGITIS: ICD-10-CM

## 2025-05-29 DIAGNOSIS — R06.02 SHORTNESS OF BREATH: ICD-10-CM

## 2025-05-29 DIAGNOSIS — Z13.29 SCREENING FOR THYROID DISORDER: ICD-10-CM

## 2025-05-29 DIAGNOSIS — F32.A ANXIETY AND DEPRESSION: ICD-10-CM

## 2025-05-29 PROBLEM — Q65.89 HIP DYSPLASIA (HHS-HCC): Status: RESOLVED | Noted: 2023-05-24 | Resolved: 2025-05-29

## 2025-05-29 PROBLEM — L23.7 CONTACT DERMATITIS DUE TO POISON IVY: Status: RESOLVED | Noted: 2023-05-24 | Resolved: 2025-05-29

## 2025-05-29 PROCEDURE — 3008F BODY MASS INDEX DOCD: CPT

## 2025-05-29 PROCEDURE — 1036F TOBACCO NON-USER: CPT

## 2025-05-29 PROCEDURE — 93000 ELECTROCARDIOGRAM COMPLETE: CPT

## 2025-05-29 PROCEDURE — 99214 OFFICE O/P EST MOD 30 MIN: CPT

## 2025-05-29 ASSESSMENT — PAIN SCALES - GENERAL: PAINLEVEL_OUTOF10: 0-NO PAIN

## 2025-05-29 ASSESSMENT — ENCOUNTER SYMPTOMS
OCCASIONAL FEELINGS OF UNSTEADINESS: 0
DEPRESSION: 0
LOSS OF SENSATION IN FEET: 0

## 2025-05-29 ASSESSMENT — PATIENT HEALTH QUESTIONNAIRE - PHQ9
2. FEELING DOWN, DEPRESSED OR HOPELESS: NOT AT ALL
1. LITTLE INTEREST OR PLEASURE IN DOING THINGS: NOT AT ALL
SUM OF ALL RESPONSES TO PHQ9 QUESTIONS 1 AND 2: 0

## 2025-05-29 NOTE — ASSESSMENT & PLAN NOTE
Orders:    Referral to Gastroenterology; Future  Referral placed to GI for further management   Lifestyle interventions:  Avoiding foods that may be irritating such as chocolate, caffeine, alcohol, acid/spicy foods, carbonated beverages, and fatty foods.  Avoiding eating within 3 hours of bedtime. May try raising the head of bed at night.  Try to eat 3-5 smaller meals per day.   Weight loss.  Smoking/nicotine cessation.

## 2025-05-29 NOTE — ASSESSMENT & PLAN NOTE
Orders:    Holter or Event Cardiac Monitor; Future    ECG 12 lead (Clinic Performed)    Comprehensive Metabolic Panel; Future    CBC and Auto Differential; Future  I have ordered a holter monitor for you. Please call  [phone] to set up an appointment for application.   If you should have increasing chest pressure, pain, shortness of breath please call [phone] to be taken to the ER.

## 2025-05-29 NOTE — PATIENT INSTRUCTIONS
Assessment/Plan   Assessment & Plan  Follow-up exam, 3-6 months since previous exam    Anxiety and depression  Continue lexapro 15mg once daily   -Relaxation techniques- deep breathing, meditation, acupuncture, guided imagery, yoga  -3-3-3 sit down in a quiet room. Look around the room and name 3 items you see. Then close your eyes and listen and quietly name 3 items you hear around you. Lastly, move 3 different body parts in a [place] 10 times each  -Avoid caffeine, alcohol, illicit drug use  -Get enough sleep, 7-9 hours per night  -Eat a healthy diet, prioritizing lean proteins, fruits/vegetables, and whole grains  -Purposeful movement daily- walking, biking, strength training  -Speaking with a counselor can be very helpful. Consider a referral to behavioral health    Gastroesophageal reflux disease without esophagitis    Orders:    Referral to Gastroenterology; Future  Referral placed to GI for further management   Lifestyle interventions:  Avoiding foods that may be irritating such as chocolate, caffeine, alcohol, acid/spicy foods, carbonated beverages, and fatty foods.  Avoiding eating within 3 hours of bedtime. May try raising the head of bed at night.  Try to eat 3-5 smaller meals per day.   Weight loss.  Smoking/nicotine cessation.     Shortness of breath    Orders:    Holter or Event Cardiac Monitor; Future    ECG 12 lead (Clinic Performed)  If you should have increased chest pain, pressure shortness of breath please call [phone] to be taken to the ER.   Chest pressure    Orders:    Holter or Event Cardiac Monitor; Future    ECG 12 lead (Clinic Performed)    Comprehensive Metabolic Panel; Future    CBC and Auto Differential; Future  I have ordered a holter monitor for you. Please call  [phone] to set up an appointment for application.   If you should have increasing chest pressure, pain, shortness of breath please call [phone] to be taken to the ER.   Elevated LDL cholesterol level    Orders:    Lipid Panel;  Future    Decrease intake of saturated fats, fast food, sweets.  Increase intake of fresh fruit fresh vegetables and lean meats.  Increase healthy fats seeds, nuts, olive oil instead of butter.  walk 150 minutes/week for heart health.   Aim for 25-30 grams of fiber in your diet daily.  May consider adding Fish Oil supplement 1,200 mg per day or Omega 3 Supplement daily.      Vitamin D deficiency disease    Orders:    Vitamin D 25-Hydroxy,Total (for eval of Vitamin D levels); Future    Screening for thyroid disorder    Orders:    TSH with reflex to Free T4 if abnormal; Future    Screening for diabetes mellitus (DM)    Orders:    Hemoglobin A1C; Future

## 2025-05-29 NOTE — ASSESSMENT & PLAN NOTE
Continue lexapro 15mg once daily   -Relaxation techniques- deep breathing, meditation, acupuncture, guided imagery, yoga  -3-3-3 sit down in a quiet room. Look around the room and name 3 items you see. Then close your eyes and listen and quietly name 3 items you hear around you. Lastly, move 3 different body parts in a [place] 10 times each  -Avoid caffeine, alcohol, illicit drug use  -Get enough sleep, 7-9 hours per night  -Eat a healthy diet, prioritizing lean proteins, fruits/vegetables, and whole grains  -Purposeful movement daily- walking, biking, strength training  -Speaking with a counselor can be very helpful. Consider a referral to behavioral health

## 2025-05-29 NOTE — ASSESSMENT & PLAN NOTE
Orders:    Holter or Event Cardiac Monitor; Future    ECG 12 lead (Clinic Performed)  If you should have increased chest pain, pressure shortness of breath please call 911 to be taken to the ER.

## 2025-06-11 ENCOUNTER — HOSPITAL ENCOUNTER (OUTPATIENT)
Dept: CARDIOLOGY | Facility: HOSPITAL | Age: 30
Discharge: HOME | End: 2025-06-11
Payer: COMMERCIAL

## 2025-06-11 DIAGNOSIS — R07.89 CHEST PRESSURE: ICD-10-CM

## 2025-06-11 DIAGNOSIS — R06.02 SHORTNESS OF BREATH: ICD-10-CM

## 2025-06-11 PROCEDURE — 93246 EXT ECG>7D<15D RECORDING: CPT

## 2025-06-12 ENCOUNTER — OFFICE VISIT (OUTPATIENT)
Dept: URGENT CARE | Age: 30
End: 2025-06-12
Payer: COMMERCIAL

## 2025-06-12 VITALS
RESPIRATION RATE: 18 BRPM | SYSTOLIC BLOOD PRESSURE: 121 MMHG | OXYGEN SATURATION: 99 % | DIASTOLIC BLOOD PRESSURE: 70 MMHG | TEMPERATURE: 97 F | HEART RATE: 69 BPM

## 2025-06-12 DIAGNOSIS — L23.7 POISON IVY: Primary | ICD-10-CM

## 2025-06-12 DIAGNOSIS — L08.9 SOFT TISSUE INFECTION: ICD-10-CM

## 2025-06-12 RX ORDER — CEPHALEXIN 500 MG/1
500 CAPSULE ORAL 4 TIMES DAILY
Qty: 28 CAPSULE | Refills: 0 | Status: SHIPPED | OUTPATIENT
Start: 2025-06-12 | End: 2025-06-19

## 2025-06-12 RX ORDER — PREDNISONE 20 MG/1
TABLET ORAL
Qty: 24 TABLET | Refills: 0 | Status: SHIPPED | OUTPATIENT
Start: 2025-06-12 | End: 2025-06-27

## 2025-06-12 NOTE — PROGRESS NOTES
Subjective   Patient ID: Zara Corey is a 29 y.o. female. They present today with a chief complaint of Rash (Poison ivy rash all over x 5 days ).    History of Present Illness  Zara Corey is a 29 y.o. female who presents to the clinic for poison ivy outbreak and soft tissue infection.  Patient states she noticed 4 to 5 days ago.  Patient states she has used washes, cortisone cream and Zyrtec with little relief. Pt denies any chest pain, sob, N/V at this time in clinic.             Past Medical History  Allergies as of 06/12/2025 - Reviewed 06/12/2025   Allergen Reaction Noted    Reglan [metoclopramide hcl] Other 05/24/2023    Adhesive tape-silicones Rash 02/09/2024       Prescriptions Prior to Admission[1]     Medical History[2]    Surgical History[3]     reports that she has never smoked. She has never been exposed to tobacco smoke. She has never used smokeless tobacco. She reports current alcohol use of about 2.0 standard drinks of alcohol per week. She reports that she does not use drugs.    Review of Systems  Review of Systems   Skin:  Positive for rash.   All other systems reviewed and are negative.                                 Objective    Vitals:    06/12/25 1736   BP: 121/70   Pulse: 69   Resp: 18   Temp: 36.1 °C (97 °F)   SpO2: 99%     No LMP recorded.    Physical Exam  Constitutional:       Appearance: Normal appearance.   Skin:     Findings: Rash present. Rash is crusting, urticarial and vesicular.             Comments: Vesicular lesions noted with yellow/gold crust.   Neurological:      General: No focal deficit present.      Mental Status: She is alert and oriented to person, place, and time. Mental status is at baseline.   Psychiatric:         Mood and Affect: Mood normal.         Behavior: Behavior normal.         Procedures    Point of Care Test & Imaging Results from this visit  No results found for this visit on 06/12/25.   Imaging  No results found.    Cardiology, Vascular, and Other  Imaging  No other imaging results found for the past 2 days      Diagnostic study results (if any) were reviewed by ANNALISA Gonzalez.    Assessment/Plan   Allergies, medications, history, and pertinent labs/EKGs/Imaging reviewed by ANNALISA Gonzalez.     Medical Decision Making   History and examination consistent with contact dermatitis, due to exposure to poision ivy. No evidence of infection or sepsis. Plan is for taper of steroid medications, and symptomatic support at home. Return to clinic or present to ED if symptoms change or worsen. Otherwise follow up with PCP. Patient verbalized understanding and agrees with plan.     Areas noted of yellow/gold crusting.  Concern for possible impetigo.  Will treat patient with Keflex 1 tablet 4 times a day for 7 days.  Advised patient follow with her primary care doctor    Advised patient worsening symptoms go to local emergency department further evaluation.  Patient verbalized understanding.    Orders and Diagnoses  Diagnoses and all orders for this visit:  Poison ivy  -     predniSONE (Deltasone) 20 mg tablet; Take 3 tablets (60 mg) by mouth once daily for 3 days, THEN 2 tablets (40 mg) once daily for 3 days, THEN 1.5 tablets (30 mg) once daily for 3 days, THEN 1 tablet (20 mg) once daily for 3 days, THEN 0.5 tablets (10 mg) once daily for 3 days.  Soft tissue infection  -     cephalexin (Keflex) 500 mg capsule; Take 1 capsule (500 mg) by mouth 4 times a day for 7 days.      Medical Admin Record      Patient disposition: Home    Electronically signed by ANNALISA Gonzalez  7:35 PM           [1] (Not in a hospital admission)   [2]   Past Medical History:  Diagnosis Date    Anorexia nervosa     Anxiety and depression     Dysphonia 01/30/2015    Hoarseness    Encounter for gynecological examination (general) (routine) without abnormal findings 06/25/2021    Well woman exam with routine gynecological exam    Encounter for gynecological examination  (general) (routine) without abnormal findings 11/15/2017    Well woman exam with routine gynecological exam    Encounter for gynecological examination (general) (routine) without abnormal findings 06/01/2020    Well woman exam with routine gynecological exam    Personal history of other diseases of the respiratory system 01/23/2015    History of asthma    PONV (postoperative nausea and vomiting)    [3]   Past Surgical History:  Procedure Laterality Date    HIP ARTHROSCOPY W/ LABRAL REPAIR Right     OVARIAN CYST REMOVAL      TONSILLECTOMY  11/15/2017    Tonsillectomy

## 2025-06-12 NOTE — PATIENT INSTRUCTIONS
Prednisone taper please take steroids in the morning.  You can start the taper tonight.  Keflex 1 tablet 4 times a day for 7 days.  Calamine lotion please take as directed label.  Please take for 7 days.  Please take over-the-counter probiotic tablet.  Please take for 7 days.  Please call your primary care doctor next 5 to 7 days.  If worsening symptoms, please go to local emerged department for further evaluation.    Please follow up with your primary provider within one week if symptoms do not improve.  You may schedule an appointment [facility] at [email or web address] or call [phone]. Go to the Emergency Department if symptoms significantly worsen or if you develop chest pain or shortness of breath.

## 2025-06-24 ENCOUNTER — PATIENT MESSAGE (OUTPATIENT)
Dept: PRIMARY CARE | Facility: CLINIC | Age: 30
End: 2025-06-24
Payer: COMMERCIAL

## 2025-06-24 DIAGNOSIS — D72.820 LYMPHOCYTOSIS: Primary | ICD-10-CM

## 2025-06-24 DIAGNOSIS — E78.2 MIXED HYPERLIPIDEMIA: ICD-10-CM

## 2025-06-24 LAB
25(OH)D3+25(OH)D2 SERPL-MCNC: 27 NG/ML (ref 30–100)
ALBUMIN SERPL-MCNC: 4.3 G/DL (ref 3.6–5.1)
ALP SERPL-CCNC: 42 U/L (ref 31–125)
ALT SERPL-CCNC: 12 U/L (ref 6–29)
ANION GAP SERPL CALCULATED.4IONS-SCNC: 10 MMOL/L (CALC) (ref 7–17)
AST SERPL-CCNC: 12 U/L (ref 10–30)
BASOPHILS # BLD AUTO: 63 CELLS/UL (ref 0–200)
BASOPHILS NFR BLD AUTO: 0.5 %
BILIRUB SERPL-MCNC: 0.6 MG/DL (ref 0.2–1.2)
BUN SERPL-MCNC: 15 MG/DL (ref 7–25)
CALCIUM SERPL-MCNC: 8.6 MG/DL (ref 8.6–10.2)
CHLORIDE SERPL-SCNC: 102 MMOL/L (ref 98–110)
CHOLEST SERPL-MCNC: 221 MG/DL
CHOLEST/HDLC SERPL: 2.9 (CALC)
CO2 SERPL-SCNC: 26 MMOL/L (ref 20–32)
CREAT SERPL-MCNC: 0.78 MG/DL (ref 0.5–0.96)
EGFRCR SERPLBLD CKD-EPI 2021: 105 ML/MIN/1.73M2
EOSINOPHIL # BLD AUTO: 277 CELLS/UL (ref 15–500)
EOSINOPHIL NFR BLD AUTO: 2.2 %
ERYTHROCYTE [DISTWIDTH] IN BLOOD BY AUTOMATED COUNT: 12 % (ref 11–15)
EST. AVERAGE GLUCOSE BLD GHB EST-MCNC: 97 MG/DL
EST. AVERAGE GLUCOSE BLD GHB EST-SCNC: 5.4 MMOL/L
GLUCOSE SERPL-MCNC: 83 MG/DL (ref 65–99)
HBA1C MFR BLD: 5 %
HCT VFR BLD AUTO: 42.6 % (ref 35–45)
HDLC SERPL-MCNC: 75 MG/DL
HGB BLD-MCNC: 14 G/DL (ref 11.7–15.5)
LDLC SERPL CALC-MCNC: 120 MG/DL (CALC)
LYMPHOCYTES # BLD AUTO: 4725 CELLS/UL (ref 850–3900)
LYMPHOCYTES NFR BLD AUTO: 37.5 %
MCH RBC QN AUTO: 30.3 PG (ref 27–33)
MCHC RBC AUTO-ENTMCNC: 32.9 G/DL (ref 32–36)
MCV RBC AUTO: 92.2 FL (ref 80–100)
MONOCYTES # BLD AUTO: 844 CELLS/UL (ref 200–950)
MONOCYTES NFR BLD AUTO: 6.7 %
NEUTROPHILS # BLD AUTO: 6691 CELLS/UL (ref 1500–7800)
NEUTROPHILS NFR BLD AUTO: 53.1 %
NONHDLC SERPL-MCNC: 146 MG/DL (CALC)
PLATELET # BLD AUTO: 246 THOUSAND/UL (ref 140–400)
PMV BLD REES-ECKER: 8.5 FL (ref 7.5–12.5)
POTASSIUM SERPL-SCNC: 4 MMOL/L (ref 3.5–5.3)
PROT SERPL-MCNC: 6.8 G/DL (ref 6.1–8.1)
RBC # BLD AUTO: 4.62 MILLION/UL (ref 3.8–5.1)
SODIUM SERPL-SCNC: 138 MMOL/L (ref 135–146)
TRIGL SERPL-MCNC: 145 MG/DL
TSH SERPL-ACNC: 1.66 MIU/L
WBC # BLD AUTO: 12.6 THOUSAND/UL (ref 3.8–10.8)

## 2025-06-24 RX ORDER — ATORVASTATIN CALCIUM 10 MG/1
10 TABLET, FILM COATED ORAL DAILY
Qty: 90 TABLET | Refills: 1 | Status: SHIPPED | OUTPATIENT
Start: 2025-06-24 | End: 2025-12-21

## 2025-07-23 ENCOUNTER — OFFICE VISIT (OUTPATIENT)
Dept: GASTROENTEROLOGY | Facility: CLINIC | Age: 30
End: 2025-07-23
Payer: COMMERCIAL

## 2025-07-23 VITALS
DIASTOLIC BLOOD PRESSURE: 80 MMHG | BODY MASS INDEX: 29.35 KG/M2 | HEART RATE: 87 BPM | WEIGHT: 187 LBS | TEMPERATURE: 98.3 F | SYSTOLIC BLOOD PRESSURE: 122 MMHG | HEIGHT: 67 IN

## 2025-07-23 DIAGNOSIS — K21.9 GASTROESOPHAGEAL REFLUX DISEASE, UNSPECIFIED WHETHER ESOPHAGITIS PRESENT: ICD-10-CM

## 2025-07-23 DIAGNOSIS — R19.7 DIARRHEA, UNSPECIFIED TYPE: Primary | ICD-10-CM

## 2025-07-23 DIAGNOSIS — R13.19 OTHER DYSPHAGIA: ICD-10-CM

## 2025-07-23 PROCEDURE — 99213 OFFICE O/P EST LOW 20 MIN: CPT | Performed by: NURSE PRACTITIONER

## 2025-07-23 PROCEDURE — 3008F BODY MASS INDEX DOCD: CPT | Performed by: NURSE PRACTITIONER

## 2025-07-23 PROCEDURE — 99204 OFFICE O/P NEW MOD 45 MIN: CPT | Performed by: NURSE PRACTITIONER

## 2025-07-23 RX ORDER — WHEAT DEXTRIN 5 G/7.4 G
POWDER (GRAM) ORAL
Qty: 248 G | Refills: 1 | Status: SHIPPED | OUTPATIENT
Start: 2025-07-23

## 2025-07-23 RX ORDER — PANTOPRAZOLE SODIUM 40 MG/1
40 TABLET, DELAYED RELEASE ORAL DAILY
Qty: 30 TABLET | Refills: 11 | Status: SHIPPED | OUTPATIENT
Start: 2025-07-23 | End: 2026-07-23

## 2025-07-23 ASSESSMENT — ENCOUNTER SYMPTOMS
HEMATOLOGIC/LYMPHATIC NEGATIVE: 1
CHILLS: 0
STRIDOR: 0
PSYCHIATRIC NEGATIVE: 1
ENDOCRINE NEGATIVE: 1
COUGH: 0
RESPIRATORY NEGATIVE: 1
DIAPHORESIS: 0
APNEA: 0
ALLERGIC/IMMUNOLOGIC NEGATIVE: 1
SHORTNESS OF BREATH: 0
EYES NEGATIVE: 1
NEUROLOGICAL NEGATIVE: 1
FEVER: 0
WHEEZING: 0
DIFFICULTY URINATING: 0
MUSCULOSKELETAL NEGATIVE: 1
CHEST TIGHTNESS: 0
FATIGUE: 0
CARDIOVASCULAR NEGATIVE: 1
ROS GI COMMENTS: SEE HPI

## 2025-07-23 ASSESSMENT — PAIN SCALES - GENERAL: PAINLEVEL_OUTOF10: 0-NO PAIN

## 2025-07-23 NOTE — PROGRESS NOTES
Subjective   Patient ID: Zara Corey is a 29 y.o. female who presents for GERD and Heartburn.    Presents today for GERD, brother and niece have EOE  She reports at times difficulty to swallow  This occurs to solids only, alleviated with drinking liquids  Ongoing for approximately 6 months     She is having epigastric discomfort-ongoing for 6 months  She has had intermittent reflux since childhood  She is only using Tums    She reports diarrhea, not formed. This is ongoing for 5-6 years. She has a BM once daily, +gas and bloating  She has tried dietary changes, she follows a low-FODMAP diet   She is mostly getting fiber through oatmeal and vegetables    She reports blood when she had fissure, no blood since, she denies melena, vomiting, weight loss, night sweats, nocturnal BMs    CBC -6/23 no anemia     Family Hx: Brother and niece have EOE    Review of Systems   Constitutional:  Negative for chills, diaphoresis, fatigue and fever.   HENT: Negative.     Eyes: Negative.    Respiratory: Negative.  Negative for apnea, cough, chest tightness, shortness of breath, wheezing and stridor.    Cardiovascular: Negative.    Gastrointestinal:         See HPI    Endocrine: Negative.    Genitourinary: Negative.  Negative for difficulty urinating.   Musculoskeletal: Negative.    Skin: Negative.    Allergic/Immunologic: Negative.    Neurological: Negative.    Hematological: Negative.    Psychiatric/Behavioral: Negative.         Objective   Physical Exam  Constitutional:       Appearance: Normal appearance.   HENT:      Nose: Nose normal.     Eyes:      General: Lids are normal.       Cardiovascular:      Rate and Rhythm: Normal rate and regular rhythm.      Heart sounds: Normal heart sounds.   Pulmonary:      Effort: Pulmonary effort is normal.      Breath sounds: Normal breath sounds.   Abdominal:      General: Bowel sounds are normal.      Tenderness: There is abdominal tenderness.      Comments: Epigastric  discomfort  Negative beasley's sign      Musculoskeletal:         General: Normal range of motion.     Skin:     General: Skin is warm and dry.     Neurological:      Mental Status: She is alert and oriented to person, place, and time.     Psychiatric:         Mood and Affect: Mood normal.         Assessment/Plan   Diagnoses and all orders for this visit:  Diarrhea, unspecified type  -     wheat dextrin (Benefiber Healthy Shape) 5 gram/7.4 gram powder; Take 1 teaspoon daily  -     Calprotectin, Fecal; Future  -     Celiac Panel; Future  Other dysphagia  -     Esophagogastroduodenoscopy (EGD); Future  Gastroesophageal reflux disease, unspecified whether esophagitis present  -     Esophagogastroduodenoscopy (EGD); Future  -     pantoprazole (ProtoNix) 40 mg EC tablet; Take 1 tablet (40 mg) by mouth once daily. Do not crush, chew, or split.       30 yo female with a PMH of anxiety, depression, HLD, POTs, asthma as a child, who presents today for GERD. Ongoing intermittently since childhood, she reports over the last 6 months she started having epigastric pain with intermittent episodes of solid-food dysphagia (localized to throat). Of note, brother and niece have EOE. Will proceed with EGD    She also has 1 episode of lose stools daily which I suspect is 2/2 either dietary restrictions, lack of dietary fiber. Will check a fecal calprotectin and celiac panel for completeness. Suggest increasing dietary fiber, trial of benefiber.     Follow-up after EGD      ANNALISA Culp 07/23/25 1:41 PM

## 2025-07-23 NOTE — PATIENT INSTRUCTIONS
Start benefiber one teaspoon daily with a full glass of water x 2 weeks    Try a 2 week trial of lactose free diet, 2 week trial of gluten free diet and avoidance of artifical sweeteners     Complete EGD    Check fecal calprotectin and celiac panel     Follow up after EGD        Constipation refers to a change in bowel habits, but it has varied meanings. Stools may be too hard or too small, difficult to pass, or infrequent (less than three times per week). People with constipation may also notice a frequent need to strain and a sense that the bowels are not empty.    Your bowels like consistency and routine.     Behavior changes -- The bowels are most active following meals, and this is often the time when stools will pass most readily. If you ignore your body's signals to have a bowel movement, the signals become weaker and weaker over time.    By paying close attention to these signals, you may have an easier time moving your bowels. Drinking a caffeine-containing beverage in the morning may also be helpful.    Increase fiber -- Increasing fiber in your diet may reduce or eliminate constipation. The recommended amount of dietary fiber is 20 to 35 grams of fiber per day. Examples of high fiber foods include pears, spinach, apples, raspberries.     Fiber side effects -- Consuming large amounts of fiber can cause abdominal bloating or gas; this can be minimized by starting with a small amount and slowly increasing until stools become softer and more frequent.    More recently, kiwi fruit has been identified as helping with constipation. It is recommended to eat two daily.    Whatever you decide to use, please try daily for 2 weeks to notice improvement.     If you continue to have constipation despite trying these methods, please schedule a follow-up appointment.

## 2025-07-24 DIAGNOSIS — D72.820 LYMPHOCYTOSIS: ICD-10-CM

## 2025-08-08 ENCOUNTER — APPOINTMENT (OUTPATIENT)
Dept: GASTROENTEROLOGY | Facility: HOSPITAL | Age: 30
End: 2025-08-08
Payer: COMMERCIAL

## 2025-09-08 ENCOUNTER — APPOINTMENT (OUTPATIENT)
Dept: OBSTETRICS AND GYNECOLOGY | Facility: CLINIC | Age: 30
End: 2025-09-08
Payer: COMMERCIAL

## (undated) DEVICE — SUTURE, VICRYL, 2-0, 18 IN CP-2, UNDYED

## (undated) DEVICE — Device

## (undated) DEVICE — PACK, BASIC

## (undated) DEVICE — GLOVE, SURGICAL, PROTEXIS PI MICRO, 8.0, PF, LF

## (undated) DEVICE — STOCKINETTE, IMPERVIOUS, 12 X 48 IN, LF, STERILE

## (undated) DEVICE — DRAPE COVER, C ARM, FLOUROSCAN IMAGING SYS

## (undated) DEVICE — SUTURE, MONOCRYL PLUS, 3-0, PS-2 UD 18IN

## (undated) DEVICE — BANDAGE, COFLEX, 6 X 5 YDS, FOAM TAN, STERILE, LF

## (undated) DEVICE — GLOVE, SURGICAL, PROTEXIS PI , 8.0, PF, LF

## (undated) DEVICE — GOWN, SURGICAL, SMARTGOWN, XLARGE, STERILE